# Patient Record
Sex: FEMALE | Race: WHITE | Employment: FULL TIME | URBAN - METROPOLITAN AREA
[De-identification: names, ages, dates, MRNs, and addresses within clinical notes are randomized per-mention and may not be internally consistent; named-entity substitution may affect disease eponyms.]

---

## 2017-01-12 RX ORDER — ALBUTEROL SULFATE 90 UG/1
1 AEROSOL, METERED RESPIRATORY (INHALATION) EVERY 6 HOURS PRN
Qty: 1 INHALER | Refills: 1 | Status: SHIPPED | OUTPATIENT
Start: 2017-01-12 | End: 2017-06-12

## 2017-02-21 ENCOUNTER — OFFICE VISIT (OUTPATIENT)
Dept: FAMILY MEDICINE CLINIC | Facility: CLINIC | Age: 47
End: 2017-02-21

## 2017-02-21 VITALS
HEART RATE: 64 BPM | RESPIRATION RATE: 16 BRPM | HEIGHT: 65 IN | BODY MASS INDEX: 36.99 KG/M2 | TEMPERATURE: 98 F | SYSTOLIC BLOOD PRESSURE: 126 MMHG | WEIGHT: 222 LBS | DIASTOLIC BLOOD PRESSURE: 78 MMHG

## 2017-02-21 DIAGNOSIS — R73.01 IMPAIRED FASTING GLUCOSE: ICD-10-CM

## 2017-02-21 DIAGNOSIS — G24.5 EYE TWITCH: Primary | ICD-10-CM

## 2017-02-21 DIAGNOSIS — Z00.00 LABORATORY EXAMINATION ORDERED AS PART OF A ROUTINE GENERAL MEDICAL EXAMINATION: ICD-10-CM

## 2017-02-21 PROCEDURE — 99213 OFFICE O/P EST LOW 20 MIN: CPT | Performed by: PHYSICIAN ASSISTANT

## 2017-02-22 NOTE — PROGRESS NOTES
[facility] Internal Medicine Progress Note    CC:  Patient presents with:  Eye Twitching: wants referral to neuro      HPI:   HPI  Eye twitching  R eye twitching off and on for the last 7 years  She states you can't see it twitch  She went and sa for nausea and vomiting. Neurological: Negative for dizziness, light-headedness and headaches. /78 mmHg  Pulse 64  Temp(Src) 97.9 °F (36.6 °C) (Oral)  Resp 16  Ht 65\"  Wt 222 lb  BMI 36.94 kg/m2 Body mass index is 36.94 kg/(m^2).   Physical understanding.     Problem List:  Patient Active Problem List:     Iron deficiency anemia     Sacral back pain     Elevated blood pressure     Reactive airway disease     Depression     Rheumatoid arthritis involving multiple sites with positive rheumatoid

## 2017-02-22 NOTE — PATIENT INSTRUCTIONS
Thank you for choosing Bobby James PA-C at [place] [address]  To Do: Sandeep Frost  1. Pt to get lab work done  2. Referral for neuro  3.  RTC in 3 months, sooner if problems    • Please signup for MY CHART, which is electronic access to you improve your quality of life.     Referrals, and Radiology Information:    If your insurance requires a referral to a specialist, please allow 5 business days to process your referral request.    If Kita Muñiz PA-C orders a CT or MRI, it may take up

## 2017-03-01 ENCOUNTER — HOSPITAL ENCOUNTER (OUTPATIENT)
Dept: MRI IMAGING | Age: 47
Discharge: HOME | End: 2017-03-01
Attending: OTOLARYNGOLOGY
Payer: COMMERCIAL

## 2017-03-01 DIAGNOSIS — R42 DIZZINESS: ICD-10-CM

## 2017-03-01 PROCEDURE — 70543 MRI ORBT/FAC/NCK W/O &W/DYE: CPT

## 2017-03-01 PROCEDURE — A9575 INJ GADOTERATE MEGLUMI 0.1ML: HCPCS | Performed by: OTOLARYNGOLOGY

## 2017-03-06 ENCOUNTER — LAB ENCOUNTER (OUTPATIENT)
Dept: LAB | Age: 47
End: 2017-03-06
Attending: Other
Payer: COMMERCIAL

## 2017-03-06 ENCOUNTER — OFFICE VISIT (OUTPATIENT)
Dept: NEUROLOGY | Facility: CLINIC | Age: 47
End: 2017-03-06

## 2017-03-06 VITALS
RESPIRATION RATE: 16 BRPM | HEART RATE: 72 BPM | WEIGHT: 222 LBS | SYSTOLIC BLOOD PRESSURE: 130 MMHG | BODY MASS INDEX: 35.68 KG/M2 | DIASTOLIC BLOOD PRESSURE: 79 MMHG | HEIGHT: 66 IN

## 2017-03-06 DIAGNOSIS — G24.5 EYE TWITCH: ICD-10-CM

## 2017-03-06 DIAGNOSIS — R29.2 HYPER REFLEXIA: ICD-10-CM

## 2017-03-06 DIAGNOSIS — R39.15 URINARY URGENCY: ICD-10-CM

## 2017-03-06 DIAGNOSIS — Z00.00 LABORATORY EXAMINATION ORDERED AS PART OF A ROUTINE GENERAL MEDICAL EXAMINATION: ICD-10-CM

## 2017-03-06 DIAGNOSIS — H53.8 BLURRING, RIGHT EYE: Primary | ICD-10-CM

## 2017-03-06 DIAGNOSIS — R51.9 CHRONIC INTRACTABLE HEADACHE, UNSPECIFIED HEADACHE TYPE: ICD-10-CM

## 2017-03-06 DIAGNOSIS — G89.29 CHRONIC INTRACTABLE HEADACHE, UNSPECIFIED HEADACHE TYPE: ICD-10-CM

## 2017-03-06 DIAGNOSIS — H53.8 BLURRING, RIGHT EYE: ICD-10-CM

## 2017-03-06 DIAGNOSIS — R73.01 IMPAIRED FASTING GLUCOSE: ICD-10-CM

## 2017-03-06 LAB
BASOPHILS # BLD AUTO: 0.13 X10(3) UL (ref 0–0.1)
BASOPHILS NFR BLD AUTO: 1.4 %
C-REACTIVE PROTEIN: 0.99 MG/DL (ref ?–1)
CK: 36 IU/L (ref 26–192)
EOSINOPHIL # BLD AUTO: 0.35 X10(3) UL (ref 0–0.3)
EOSINOPHIL NFR BLD AUTO: 3.9 %
ERYTHROCYTE [DISTWIDTH] IN BLOOD BY AUTOMATED COUNT: 15.3 % (ref 11.5–16)
EST. AVERAGE GLUCOSE BLD GHB EST-MCNC: 111 MG/DL (ref 68–126)
FREE T4: 0.9 NG/DL (ref 0.9–1.8)
HBA1C MFR BLD HPLC: 5.5 % (ref ?–5.7)
HCT VFR BLD AUTO: 40.5 % (ref 34–50)
HGB BLD-MCNC: 12.9 G/DL (ref 12–16)
IMMATURE GRANULOCYTE COUNT: 0.06 X10(3) UL (ref 0–1)
IMMATURE GRANULOCYTE RATIO %: 0.7 %
LYMPHOCYTES # BLD AUTO: 2.89 X10(3) UL (ref 0.9–4)
LYMPHOCYTES NFR BLD AUTO: 31.9 %
MCH RBC QN AUTO: 26.1 PG (ref 27–33.2)
MCHC RBC AUTO-ENTMCNC: 31.9 G/DL (ref 31–37)
MCV RBC AUTO: 81.8 FL (ref 81–100)
MONOCYTES # BLD AUTO: 0.53 X10(3) UL (ref 0.1–0.6)
MONOCYTES NFR BLD AUTO: 5.9 %
NEUTROPHIL ABS PRELIM: 5.09 X10 (3) UL (ref 1.3–6.7)
NEUTROPHILS # BLD AUTO: 5.09 X10(3) UL (ref 1.3–6.7)
NEUTROPHILS NFR BLD AUTO: 56.2 %
PLATELET # BLD AUTO: 228 10(3)UL (ref 150–450)
RBC # BLD AUTO: 4.95 X10(6)UL (ref 3.8–5.1)
RED CELL DISTRIBUTION WIDTH-SD: 45.3 FL (ref 35.1–46.3)
RHEUMATOID FACT SERPL-ACNC: <10 IU/ML (ref ?–15)
SED RATE-ML: 10 MM/HR (ref 0–25)
TSI SER-ACNC: 1.19 MIU/ML (ref 0.35–5.5)
WBC # BLD AUTO: 9.1 X10(3) UL (ref 4–13)

## 2017-03-06 PROCEDURE — 99244 OFF/OP CNSLTJ NEW/EST MOD 40: CPT | Performed by: OTHER

## 2017-03-06 RX ORDER — GABAPENTIN 100 MG/1
CAPSULE ORAL
Qty: 90 CAPSULE | Refills: 2 | Status: SHIPPED | OUTPATIENT
Start: 2017-03-06 | End: 2017-04-06

## 2017-03-06 NOTE — H&P
[facility] Patient / Consult Visit    Lilia Lakhani is a 48year old female.                          Referring MD: Karine Stephens    Patient presents with:  Eye Twitching: Right eye      HPI:    Lilia Lakhani is a chest pain, palpitations, shortness of breath, rashes, joint pains, bowel / bladder incontinence or mood issues.      Past Medical History   Diagnosis Date   • Extrinsic asthma, unspecified    • Depression          Past Surgical History          TO the HPI. PHYSICAL EXAM:   /79 mmHg  Pulse 72  Resp 16  Ht 66\"  Wt 222 lb  BMI 35.85 kg/m2  Estimated body mass index is 35.85 kg/(m^2) as calculated from the following:    Height as of this encounter: 66\".     Weight as of this encounter: 222 l Gait:  Normal casual, heel, toe and tandem gait    TEST RESULTS/DATA REVIEWED:     MRI brain / orbits (3/1/17):     FINDINGS:  Motion degraded examination.      The ventricles and sulci are within normal limits.  There is no midline shift or mass effect.  age.  Adrianna Current  =====  CONCLUSION:     1. Unremarkable MR appearance of the orbits and internal auditory canals. 2. No acute intracranial abnormality.   3. Trace patchy T2 hyperintense signal within the periventricular white matter is nonspecific and could r symptoms. In addition, will evaluate for underlying myelopathy or demyelinating disease with MRI cervical thoracic spine, and send off labs for autoimmune/inflammatory workup (KINJAL, ANCA, NMO Ab, ESR, CRP, RF), as well as myasthenia Ab.    For treatment, (CPT=72156/93447), KINJAL,         DIRECT, REFLEX TO 9 ENAS, ANCA PANEL VASCULITIS        W/REFLEX, AQUAPORIN-4 RECEPTOR ANTIBODY,         RHEUMATOID ARTHRITIS FACTOR, SED RATE,         WESTERGREN (AUTOMATED), C-REACTIVE PROTEIN,         ANCA PANEL VASCULITIS

## 2017-03-06 NOTE — PATIENT INSTRUCTIONS
Have labs and imaging done   Start gabapentin at 100 mg nightly x1 week, increase to 200 mg x1 week, then increase to 300 mg nightly  Follow up in ~ 1 month after testing done     Refill policies:    • Allow 2 business days for refills; controlled substanc determine coverage. If test is done without insurance authorization, patient may be responsible for the entire amount billed.       Precertification and Prior Authorizations  If your physician has recommended that you have a procedure or additional testing

## 2017-03-08 LAB — ANA SCREEN: NEGATIVE

## 2017-03-09 ENCOUNTER — TELEPHONE (OUTPATIENT)
Dept: NEUROLOGY | Facility: CLINIC | Age: 47
End: 2017-03-09

## 2017-03-09 LAB
ACETYLCHOLINE BINDING AB: 0 NMOL/L
ACETYLCHOLINE BLOCKING AB: 0 %
AQUAPORIN-4 RECEPTOR ANTIBODY: 0 U/ML
MYELOPEROX ANTIBODIES, IGG: 6 AU/ML
SERINE PROTEASE3, IGG: 0 AU/ML
TITIN ANTIBODY: 0.19 IV

## 2017-03-09 NOTE — TELEPHONE ENCOUNTER
MRI Cervical / Thoracic (cpt code 62502 / 95890), CTA Brain / CTA Carotids (cpt code 75899 / 93161)  Received fax PA approved  Auth# [id number]  Valid from 03/07/17 to 06/05/17    Pt is not scheduled at this time for test. Contacted pt and advised of respon

## 2017-03-15 ENCOUNTER — TELEPHONE (OUTPATIENT)
Dept: NEUROLOGY | Facility: CLINIC | Age: 47
End: 2017-03-15

## 2017-03-15 NOTE — TELEPHONE ENCOUNTER
----- Message from Brittany Osorio MD sent at 3/7/2017  9:11 AM CST -----  Results noted, labs normal thus far - awaiting additional labs    All labs results available in Epic. Spoke with Dr. Shital Cook, all have come back normal. To still complete MRI.  Deonna

## 2017-03-17 ENCOUNTER — HOSPITAL ENCOUNTER (OUTPATIENT)
Dept: MRI IMAGING | Age: 47
Discharge: HOME | End: 2017-03-17
Attending: Other
Payer: COMMERCIAL

## 2017-03-17 ENCOUNTER — HOSPITAL ENCOUNTER (OUTPATIENT)
Dept: CT IMAGING | Age: 47
Discharge: HOME | End: 2017-03-17
Attending: Other
Payer: COMMERCIAL

## 2017-03-17 DIAGNOSIS — G89.29 CHRONIC INTRACTABLE HEADACHE, UNSPECIFIED HEADACHE TYPE: ICD-10-CM

## 2017-03-17 DIAGNOSIS — R39.15 URINARY URGENCY: ICD-10-CM

## 2017-03-17 DIAGNOSIS — R29.2 HYPER REFLEXIA: ICD-10-CM

## 2017-03-17 DIAGNOSIS — R51.9 CHRONIC INTRACTABLE HEADACHE, UNSPECIFIED HEADACHE TYPE: ICD-10-CM

## 2017-03-17 DIAGNOSIS — H53.8 BLURRING, RIGHT EYE: ICD-10-CM

## 2017-03-17 DIAGNOSIS — G24.5 EYE TWITCH: ICD-10-CM

## 2017-03-17 PROCEDURE — 72157 MRI CHEST SPINE W/O & W/DYE: CPT

## 2017-03-17 PROCEDURE — A9575 INJ GADOTERATE MEGLUMI 0.1ML: HCPCS | Performed by: OTHER

## 2017-03-17 PROCEDURE — 70496 CT ANGIOGRAPHY HEAD: CPT

## 2017-03-17 PROCEDURE — 72156 MRI NECK SPINE W/O & W/DYE: CPT

## 2017-03-17 PROCEDURE — 70498 CT ANGIOGRAPHY NECK: CPT

## 2017-04-06 ENCOUNTER — OFFICE VISIT (OUTPATIENT)
Dept: NEUROLOGY | Facility: CLINIC | Age: 47
End: 2017-04-06

## 2017-04-06 VITALS
HEART RATE: 80 BPM | DIASTOLIC BLOOD PRESSURE: 80 MMHG | SYSTOLIC BLOOD PRESSURE: 138 MMHG | WEIGHT: 221 LBS | BODY MASS INDEX: 36 KG/M2 | RESPIRATION RATE: 18 BRPM

## 2017-04-06 DIAGNOSIS — G24.5 EYE TWITCH: Primary | ICD-10-CM

## 2017-04-06 DIAGNOSIS — H53.8 BLURRING, RIGHT EYE: ICD-10-CM

## 2017-04-06 DIAGNOSIS — R29.2 HYPER REFLEXIA: ICD-10-CM

## 2017-04-06 PROCEDURE — 99214 OFFICE O/P EST MOD 30 MIN: CPT | Performed by: OTHER

## 2017-04-06 RX ORDER — GABAPENTIN 300 MG/1
300 CAPSULE ORAL 3 TIMES DAILY
Qty: 90 CAPSULE | Refills: 2 | Status: SHIPPED | OUTPATIENT
Start: 2017-04-06 | End: 2017-07-10

## 2017-04-06 NOTE — PATIENT INSTRUCTIONS
Increase gabapentin to 300 mg three times daily  Schedule EMG  Follow up after testing done; see PCP as well. Refill policies:    • Allow 2 business days for refills; controlled substances may take longer.   • Contact your pharmacy at least 5 days prior t have a procedure or additional testing performed. [facility]) will contact your insurance carrier to obtain pre-certification or prior authorization.     Unfortunately, [facility] has seen an increase in denial of payment even though the p

## 2017-04-06 NOTE — PROGRESS NOTES
[facility] Progress Note    HPI  Patient presents with:  Neurologic Problem      As per my initial H&P from 3/6/17:  Emmanuel Ringjose Cortez is a 48year old, who presents for evaluation of right eye twitching.  She states she feels like incontinence or mood issues. \"                 Since last visit, she has been started on gabapentin - notes twitching is less than prior visits at times when she is watching television. She denies any worsening of her prior symptoms.   She continues to [employer] gabapentin 100 MG Oral Cap, Start at 100 mg nightly x1 week, increase to 200 mg x1 week, then increase to 300 mg nightly (Patient taking differently: Take 300 mg by mouth nightly.  ), Disp: 90 capsule, Rfl: 2  •  Albuterol Sulfate  (90 BASE) MCG/ACT 10(3)uL 228.0   MCV      81.0-100.0 fL 81.8   MCH      27.0-33.2 pg 26.1 (L)   MCHC      31.0-37.0 g/dL 31.9   RDW      11.5-16.0 % 15.3   RDW-SD      35.1-46.3 fL 45.3   Prelim Neutrophil Abs      1.30-6.70 x10 (3) uL 5.09   Neutrophils Absolute      1.30 CISTERNS:  Normal.  No subarachnoid hemorrhage or effacement.     SKULL:  Negative.         LEFT  INTERNAL CAROTID:  No hemodynamically significant stenosis or dissection.   EXTERNAL CAROTID:  No hemodynamically significant stenosis or dissection  COMMON CA with no visible mass.     PATTIE STRUCTURES:  Hemangioma within C4.  No fracture, pars defect, or osseous lesion.     CORD:  Normal caliber, contour and signal intensity.         THORACIC DISC LEVELS:  CORD:  Normal caliber, contour, and signal.  The conus t reported thus far.      (G24.5) Eye twitch  (primary encounter diagnosis)  Plan: gabapentin 300 MG Oral Cap, EMG ([address] [place] [address])        As noted above     (H53.8) Blurring, right eye  Plan: as noted above     (R29.2) Hyper r

## 2017-05-15 ENCOUNTER — OFFICE VISIT (OUTPATIENT)
Dept: NEUROLOGY | Facility: CLINIC | Age: 47
End: 2017-05-15

## 2017-05-15 DIAGNOSIS — R29.2 HYPER REFLEXIA: ICD-10-CM

## 2017-05-15 DIAGNOSIS — G24.5 EYE TWITCH: Primary | ICD-10-CM

## 2017-05-15 PROCEDURE — 95886 MUSC TEST DONE W/N TEST COMP: CPT | Performed by: OTHER

## 2017-05-15 PROCEDURE — 95910 NRV CNDJ TEST 7-8 STUDIES: CPT | Performed by: OTHER

## 2017-05-15 NOTE — PROCEDURES
[facility]  [address] [place], [address] [facility]  Ph: [phone]  FAX: [phone]        Full Name: Linnea Riley Gender: Female  Patient ID: [id number] YOB: 1970      Visit Date: 5/15/2017 1 Ankle AH 4.11 13.2 100 6.25 39.4 Ankle - AH 8        Pop fossa AH 11.77 10.9 82.3 6.98 40.3 Pop fossa - Ankle 36 7.66 47       EMG         EMG Summary Table     Spontaneous MUAP Recruitment   Muscle Nerve Roots IA Fib PSW Fasc H.F. Amp Dur.  PPP Pattern

## 2017-06-12 ENCOUNTER — OFFICE VISIT (OUTPATIENT)
Dept: FAMILY MEDICINE CLINIC | Facility: CLINIC | Age: 47
End: 2017-06-12

## 2017-06-12 VITALS
HEIGHT: 66 IN | HEART RATE: 82 BPM | SYSTOLIC BLOOD PRESSURE: 130 MMHG | TEMPERATURE: 98 F | RESPIRATION RATE: 16 BRPM | BODY MASS INDEX: 37.45 KG/M2 | DIASTOLIC BLOOD PRESSURE: 86 MMHG | WEIGHT: 233 LBS

## 2017-06-12 DIAGNOSIS — L30.9 DERMATITIS: ICD-10-CM

## 2017-06-12 DIAGNOSIS — F32.A DEPRESSION, UNSPECIFIED DEPRESSION TYPE: ICD-10-CM

## 2017-06-12 DIAGNOSIS — J45.20 REACTIVE AIRWAY DISEASE, MILD INTERMITTENT, UNCOMPLICATED: ICD-10-CM

## 2017-06-12 DIAGNOSIS — N28.1 RENAL CYST: Primary | ICD-10-CM

## 2017-06-12 DIAGNOSIS — M54.50 BILATERAL LOW BACK PAIN WITHOUT SCIATICA, UNSPECIFIED CHRONICITY: ICD-10-CM

## 2017-06-12 PROCEDURE — 99214 OFFICE O/P EST MOD 30 MIN: CPT | Performed by: PHYSICIAN ASSISTANT

## 2017-06-12 RX ORDER — ESCITALOPRAM OXALATE 20 MG/1
20 TABLET ORAL DAILY
Qty: 90 TABLET | Refills: 1 | Status: SHIPPED | OUTPATIENT
Start: 2017-06-12 | End: 2017-07-05

## 2017-06-12 RX ORDER — METHYLPREDNISOLONE 4 MG/1
TABLET ORAL
Qty: 1 KIT | Refills: 0 | Status: SHIPPED | OUTPATIENT
Start: 2017-06-12 | End: 2017-06-24

## 2017-06-12 RX ORDER — EPINEPHRINE 0.3 MG/.3ML
0.3 INJECTION SUBCUTANEOUS ONCE
Qty: 1 EACH | Refills: 0 | Status: SHIPPED | OUTPATIENT
Start: 2017-06-12 | End: 2017-06-12

## 2017-06-12 RX ORDER — ALBUTEROL SULFATE 90 UG/1
1 AEROSOL, METERED RESPIRATORY (INHALATION) EVERY 6 HOURS PRN
Qty: 1 INHALER | Refills: 3 | Status: SHIPPED | OUTPATIENT
Start: 2017-06-12 | End: 2019-11-19

## 2017-06-12 RX ORDER — METHOCARBAMOL 750 MG/1
750 TABLET, FILM COATED ORAL 3 TIMES DAILY
Qty: 30 TABLET | Refills: 0 | Status: SHIPPED | OUTPATIENT
Start: 2017-06-12 | End: 2017-06-24

## 2017-06-12 NOTE — PROGRESS NOTES
[facility] Internal Medicine Progress Note    CC:  Patient presents with:  Test Results  Derm Problem  Medication Request  Low Back Pain: x months - wants PT      HPI:   HPI  Renal cyst  Incidental found on MRI of cervical/thoracic spine  MRI albrecht Prescriptions on File Prior to Visit:  gabapentin 300 MG Oral Cap Take 1 capsule (300 mg total) by mouth 3 (three) times daily.  Disp: 90 capsule Rfl: 2   Fluticasone Propionate (FLONASE) 50 MCG/ACT Nasal Suspension 2 sprays in each nostril daily as needed tenderness, no bony tenderness, no swelling, no edema, no deformity, no laceration, no spasm and normal pulse. Strength, sensation, DTRs, pulse intact to bilateral LEs   Lymphadenopathy:     She has no cervical adenopathy.    Neurological: She is alert an Patient/Caregiver Education: There are no barriers to learning. Medical education done. Outcome: Patient verbalizes understanding.     Problem List:  Patient Active Problem List:     Iron deficiency anemia     Sacral back pain     Elevated blood pressure

## 2017-06-12 NOTE — PATIENT INSTRUCTIONS
Thank you for choosing Obed Peña PA-C at [place] [address]  To Do: Kinga Crandall  1. Pt to begin medications as prescribed  2. Get imaging done  3. Start physical therapy  4. Follow-up with Beth  5.  Return to clinic in 1 month, sooner if benefits outweigh those potential risks and we strive to make you healthier and to improve your quality of life.     Referrals, and Radiology Information:    If your insurance requires a referral to a specialist, please allow 5 business days to process your

## 2017-06-13 ENCOUNTER — HOSPITAL ENCOUNTER (OUTPATIENT)
Dept: ULTRASOUND IMAGING | Age: 47
Discharge: HOME | End: 2017-06-13
Attending: PHYSICIAN ASSISTANT
Payer: COMMERCIAL

## 2017-06-13 DIAGNOSIS — N28.1 RENAL CYST: ICD-10-CM

## 2017-06-13 PROCEDURE — 76770 US EXAM ABDO BACK WALL COMP: CPT | Performed by: PHYSICIAN ASSISTANT

## 2017-06-22 ENCOUNTER — PATIENT MESSAGE (OUTPATIENT)
Dept: FAMILY MEDICINE CLINIC | Facility: CLINIC | Age: 47
End: 2017-06-22

## 2017-06-22 NOTE — TELEPHONE ENCOUNTER
Future Appointments  Date Time Provider Lang Nohemi   6/28/2017 4:45 PM Afshin Cortez PT [facility] [place]   7/3/2017 6:30 PM ISHA Velasquez PT [place]   7/5/2017 6:30 PM Priyanka Hardy PT [facility] [place]   7/10/2017 6:15 PM Heidi Sterling

## 2017-06-22 NOTE — TELEPHONE ENCOUNTER
From: Meghan Rebollar  To: Myles Muniz  Sent: 6/22/2017 8:46 AM CDT  Subject: Non-Urgent Medical Question    Lennox Andrews finished the round of steroids for the rash, but I still have the rash on my arm and it's still itching and has

## 2017-06-24 ENCOUNTER — OFFICE VISIT (OUTPATIENT)
Dept: FAMILY MEDICINE CLINIC | Facility: CLINIC | Age: 47
End: 2017-06-24

## 2017-06-24 ENCOUNTER — HOSPITAL ENCOUNTER (OUTPATIENT)
Dept: GENERAL RADIOLOGY | Age: 47
Discharge: HOME | End: 2017-06-24
Attending: PHYSICIAN ASSISTANT
Payer: COMMERCIAL

## 2017-06-24 VITALS
DIASTOLIC BLOOD PRESSURE: 88 MMHG | HEIGHT: 66 IN | WEIGHT: 231.63 LBS | HEART RATE: 72 BPM | BODY MASS INDEX: 37.23 KG/M2 | SYSTOLIC BLOOD PRESSURE: 114 MMHG | TEMPERATURE: 98 F

## 2017-06-24 DIAGNOSIS — M79.674 TOE PAIN, RIGHT: Primary | ICD-10-CM

## 2017-06-24 DIAGNOSIS — J45.30 MILD PERSISTENT ASTHMA WITHOUT COMPLICATION: ICD-10-CM

## 2017-06-24 DIAGNOSIS — M79.674 TOE PAIN, RIGHT: ICD-10-CM

## 2017-06-24 DIAGNOSIS — L30.9 DERMATITIS: ICD-10-CM

## 2017-06-24 PROCEDURE — 73660 X-RAY EXAM OF TOE(S): CPT | Performed by: PHYSICIAN ASSISTANT

## 2017-06-24 PROCEDURE — 99214 OFFICE O/P EST MOD 30 MIN: CPT | Performed by: PHYSICIAN ASSISTANT

## 2017-06-24 RX ORDER — NYSTATIN AND TRIAMCINOLONE ACETONIDE 100000; 1 [USP'U]/G; MG/G
1 OINTMENT TOPICAL 2 TIMES DAILY
Qty: 60 G | Refills: 0 | Status: SHIPPED | OUTPATIENT
Start: 2017-06-24 | End: 2018-06-14

## 2017-06-24 RX ORDER — METHOCARBAMOL 750 MG/1
750 TABLET, FILM COATED ORAL EVERY 8 HOURS PRN
Qty: 60 TABLET | Refills: 0 | Status: SHIPPED | OUTPATIENT
Start: 2017-06-24 | End: 2018-06-14

## 2017-06-24 RX ORDER — MONTELUKAST SODIUM 10 MG/1
10 TABLET ORAL NIGHTLY
Qty: 90 TABLET | Refills: 1 | Status: SHIPPED | OUTPATIENT
Start: 2017-06-24 | End: 2017-07-31

## 2017-06-24 NOTE — PATIENT INSTRUCTIONS
Thank you for choosing Obed Peña PA-C at [place] [address]  To Do: Kinga Crandall  1. Pt to get xrays  2. Start medications as prescribed  3. Follow-up with dermatologist if symptoms are not improving  4.  Follow-up in 1 month  Effective 6/ beyond those discussed today.  All therapies have potential risk of harm or side effects or medication interactions.  It is your duty and for your safety to discuss with the pharmacist and our office with questions, and to notify us and stop treatment if p

## 2017-06-24 NOTE — PROGRESS NOTES
[facility] Internal Medicine Progress Note    CC:  Patient presents with:  Rash: not better after 1 week of steroids, spreading all over her body.       HPI:   HPI  Rash  Pt states her rash is not better and is spreading all over her body  She sta postnasal drip, rhinorrhea, sinus pressure and sore throat. Respiratory: Negative for cough and shortness of breath. Cardiovascular: Negative for chest pain. Gastrointestinal: Negative for nausea and vomiting. Skin: Positive for rash.    Neurologi start singulair, discussed side effects and adverse effects of medication    RTC in 1 month    No orders of the defined types were placed in this encounter.       Meds & Refills for this Visit:  Signed Prescriptions Disp Refills    methocarbamol 750 MG Oral

## 2017-06-28 ENCOUNTER — APPOINTMENT (OUTPATIENT)
Dept: PHYSICAL THERAPY | Age: 47
End: 2017-06-28
Payer: COMMERCIAL

## 2017-06-29 DIAGNOSIS — M79.674 TOE PAIN, RIGHT: Primary | ICD-10-CM

## 2017-06-30 ENCOUNTER — TELEPHONE (OUTPATIENT)
Dept: FAMILY MEDICINE CLINIC | Facility: CLINIC | Age: 47
End: 2017-06-30

## 2017-06-30 NOTE — TELEPHONE ENCOUNTER
vd call from Aileen Wright asking for verbal OK to fill escitalopram for #90 with 1 refill. This med was just filled for patient on [facility]12/[facility] but was not covered by insurance because was not a 30 day supply.   90 day supply will be cover

## 2017-07-03 ENCOUNTER — OFFICE VISIT (OUTPATIENT)
Dept: PHYSICAL THERAPY | Age: 47
End: 2017-07-03
Attending: PHYSICIAN ASSISTANT
Payer: COMMERCIAL

## 2017-07-03 PROCEDURE — 97162 PT EVAL MOD COMPLEX 30 MIN: CPT

## 2017-07-03 PROCEDURE — 97110 THERAPEUTIC EXERCISES: CPT

## 2017-07-03 NOTE — PROGRESS NOTES
SPINE EVALUATION:   Referring Physician: Dr. Kori Valenzuela  Diagnosis: Bilateral low back pain without sciatica, unspecified chronicity (M54.5)  Date of Service: 7/3/2017     PATIENT SUMMARY   Keyur Whitman is a 50year old y/o female who presents to muscle tone on R lumbar paraspinals, moderate tenderness on R piriformis, weakness in core musculature, flexibility restrictions in B hip flexors, B ITB and B piriformis.   Cedric Ramirez would benefit from skilled Physical Therapy to address the above impairments a Moderate Complexity decision making due to 3+ personal factors/comorbidities, 3 body structures involved/activity limitations, and evolving symptoms including changing pain levels.   PLAN OF CARE:    Goals:  (to be met in 8 visits)  · Pt will improve lower certify the need for these services furnished under this plan of treatment and while under my care.     X___________________________________________________ Date____________________    Certification From: 5/3/6227  To:10/1/2017

## 2017-07-05 ENCOUNTER — OFFICE VISIT (OUTPATIENT)
Dept: PHYSICAL THERAPY | Age: 47
End: 2017-07-05
Attending: PHYSICIAN ASSISTANT
Payer: COMMERCIAL

## 2017-07-05 PROCEDURE — 97140 MANUAL THERAPY 1/> REGIONS: CPT

## 2017-07-05 PROCEDURE — 97110 THERAPEUTIC EXERCISES: CPT

## 2017-07-05 RX ORDER — ESCITALOPRAM OXALATE 20 MG/1
20 TABLET ORAL DAILY
Qty: 90 TABLET | Refills: 1 | Status: SHIPPED | OUTPATIENT
Start: 2017-07-05 | End: 2017-07-17

## 2017-07-05 NOTE — PROGRESS NOTES
Dx:  Bilateral low back pain without sciatica, unspecified chronicity (M54.5)       Authorized # of Visits:  8        Next MD visit: none scheduled  Fall Risk: standard         Precautions: n/a             Subjective: Patient states that there is no pain i board AP with abdominal engagement x 20         instruction on finding neutral spine position in standing and while sitting on swiss ball         Standing B hip abd, flex x 10 each         Alternate leg lifts with abdominal bracing 5 sec x 10         Bent

## 2017-07-05 NOTE — TELEPHONE ENCOUNTER
Time started: 11:14am    Time ended: 11:20am    Total time spent on chart: 6 min    Spoke with patient and confirmed with her that she does want her escitalopram sent to [facility].  Her insurance requires her to have maintenance meds filled through m

## 2017-07-10 ENCOUNTER — OFFICE VISIT (OUTPATIENT)
Dept: PHYSICAL THERAPY | Age: 47
End: 2017-07-10
Attending: INTERNAL MEDICINE
Payer: COMMERCIAL

## 2017-07-10 DIAGNOSIS — G24.5 EYE TWITCH: ICD-10-CM

## 2017-07-10 PROCEDURE — 97110 THERAPEUTIC EXERCISES: CPT

## 2017-07-10 PROCEDURE — 97140 MANUAL THERAPY 1/> REGIONS: CPT

## 2017-07-10 PROCEDURE — 97112 NEUROMUSCULAR REEDUCATION: CPT

## 2017-07-10 NOTE — PROGRESS NOTES
Dx:  Bilateral low back pain without sciatica, unspecified chronicity (M54.5)       Authorized # of Visits:  8        Next MD visit: none scheduled  Fall Risk: standard         Precautions: n/a             Subjective: Patient states she walked a lot over t 7/ Date:    Tx#: 8/   Treadmill x 5 min 2.4 mph x5'        Balance board AP with abdominal engagement x 20 Balance board AP with abdominal engagement x 20        instruction on finding neutral spine position in standing and while sitting on swiss ball [place]

## 2017-07-10 NOTE — TELEPHONE ENCOUNTER
Medication: gabapentin    Date of last refill: 6/10/17  [place]    Last office visit: 4/6/17  Due back to clinic per last office note:  prn  Date next office visit scheduled:  Future Appointments  Date Time Provider D

## 2017-07-11 RX ORDER — GABAPENTIN 300 MG/1
300 CAPSULE ORAL 3 TIMES DAILY
Qty: 90 CAPSULE | Refills: 2 | Status: SHIPPED | OUTPATIENT
Start: 2017-07-11 | End: 2017-09-15

## 2017-07-12 ENCOUNTER — APPOINTMENT (OUTPATIENT)
Dept: PHYSICAL THERAPY | Age: 47
End: 2017-07-12
Payer: COMMERCIAL

## 2017-07-13 ENCOUNTER — OFFICE VISIT (OUTPATIENT)
Dept: PHYSICAL THERAPY | Age: 47
End: 2017-07-13
Attending: PHYSICIAN ASSISTANT
Payer: COMMERCIAL

## 2017-07-13 PROCEDURE — 97112 NEUROMUSCULAR REEDUCATION: CPT

## 2017-07-13 PROCEDURE — 97140 MANUAL THERAPY 1/> REGIONS: CPT

## 2017-07-13 PROCEDURE — 97110 THERAPEUTIC EXERCISES: CPT

## 2017-07-13 NOTE — PROGRESS NOTES
Dx:  Bilateral low back pain without sciatica, unspecified chronicity (M54.5)       Authorized # of Visits:  8        Next MD visit: as needed.    Fall Risk: standard         Precautions: n/a             Subjective: Pt states she is very busy at work, [employer] progress achieved in PT.- In progress    Plan: Continue physical therapy. Date: 7/5/2017  Tx#: 2/8 Date: 7/10/17  Tx#: 3/ Date: 7/13/17  Tx#: 4/ Date: Tx#: 5/ Date: Tx#: 6/ Date: Tx#: 7/ Date:    Tx#: 8/   Treadmill x 5 min 2.4 mph x5' x5       Ba

## 2017-07-17 ENCOUNTER — OFFICE VISIT (OUTPATIENT)
Dept: PHYSICAL THERAPY | Age: 47
End: 2017-07-17
Attending: INTERNAL MEDICINE
Payer: COMMERCIAL

## 2017-07-17 PROCEDURE — 97112 NEUROMUSCULAR REEDUCATION: CPT

## 2017-07-17 PROCEDURE — 97140 MANUAL THERAPY 1/> REGIONS: CPT

## 2017-07-17 PROCEDURE — 97110 THERAPEUTIC EXERCISES: CPT

## 2017-07-17 RX ORDER — ESCITALOPRAM OXALATE 20 MG/1
20 TABLET ORAL DAILY
Qty: 90 TABLET | Refills: 1 | Status: CANCELLED
Start: 2017-07-17

## 2017-07-17 NOTE — PROGRESS NOTES
Dx:  Bilateral low back pain without sciatica, unspecified chronicity (M54.5)       Authorized # of Visits:  8        Next MD visit: as needed.    Fall Risk: standard         Precautions: n/a             Subjective: Pt states she had a very busy weekend and Date:   Tx#: 6/ Date: Tx#: 7/ Date:    Tx#: 8/   Treadmill x 5 min 2.4 mph x5' x5 x6'      Balance board AP with abdominal engagement x 20 Balance board AP with abdominal engagement x 20 Balance board AP x20  ML x 20 with abdominal engagement x 20 Balance

## 2017-07-17 NOTE — TELEPHONE ENCOUNTER
From: Keyur Whitman  Sent: 7/17/2017 7:40 AM CDT  Subject: Medication Renewal Request    Anjelica Vela would like a refill of the following medications:  Montelukast Sodium 10 MG Oral Tab Moon Haider PA-C]    [facility]

## 2017-07-17 NOTE — TELEPHONE ENCOUNTER
From: Nay De Jesus  Sent: 7/17/2017 7:40 AM CDT  Subject: Medication Renewal Request    [facility] CASSIE Aguila would like a refill of the following medications:  escitalopram (LEXAPRO) 20 MG Oral Tab Yfn Hernandez MD]    Preferred pharmacy: [id number]

## 2017-07-18 ENCOUNTER — PATIENT MESSAGE (OUTPATIENT)
Dept: FAMILY MEDICINE CLINIC | Facility: CLINIC | Age: 47
End: 2017-07-18

## 2017-07-18 RX ORDER — ESCITALOPRAM OXALATE 20 MG/1
20 TABLET ORAL DAILY
Qty: 90 TABLET | Refills: 1 | Status: SHIPPED | OUTPATIENT
Start: 2017-07-18 | End: 2018-01-10

## 2017-07-18 RX ORDER — MONTELUKAST SODIUM 10 MG/1
10 TABLET ORAL NIGHTLY
Qty: 90 TABLET | Refills: 1
Start: 2017-07-18

## 2017-07-18 RX ORDER — ESCITALOPRAM OXALATE 20 MG/1
20 TABLET ORAL DAILY
Qty: 30 TABLET | Refills: 0 | Status: SHIPPED
Start: 2017-07-18 | End: 2017-07-18

## 2017-07-18 NOTE — TELEPHONE ENCOUNTER
Time started: 156    Time ended: 200    Total time spent on chart: 4 min    Spoke with pt and she states that she called mail order and they have no record of receiving the script for lexapro.   She will be completely out and is asking if we can send a 30 d

## 2017-07-18 NOTE — TELEPHONE ENCOUNTER
Pt called to check status on rx pt states she is completely out of rx states she had a refill but was told by pharmacy that someone from the office called to cx

## 2017-07-18 NOTE — TELEPHONE ENCOUNTER
From: Victorino Alberto  Sent: 7/17/2017 10:15 PM CDT  Subject: Medication Renewal Request    Pinky Zamora would like a refill of the following medications:  escitalopram (LEXAPRO) 20 MG Oral Tab Harriet Choe MD]    Preferred pharmacy: [facility]

## 2017-07-18 NOTE — TELEPHONE ENCOUNTER
Requesting: Singulair 10mg  LOV: 6/14/17  RTC: 1 month  Last Labs: 3/6/17  Filled: 6/24/17 #90 with 1 refills    Future Appointments  Date Time Provider Lang Song   7/19/2017 6:30 PM ISHA Valentino PT   7/31/2017 6:15 PM Noemi Salvador

## 2017-07-19 ENCOUNTER — OFFICE VISIT (OUTPATIENT)
Dept: PHYSICAL THERAPY | Age: 47
End: 2017-07-19
Attending: PHYSICIAN ASSISTANT
Payer: COMMERCIAL

## 2017-07-19 PROCEDURE — 97140 MANUAL THERAPY 1/> REGIONS: CPT

## 2017-07-19 PROCEDURE — 97110 THERAPEUTIC EXERCISES: CPT

## 2017-07-19 PROCEDURE — 97112 NEUROMUSCULAR REEDUCATION: CPT

## 2017-07-19 NOTE — PROGRESS NOTES
Dx:  Bilateral low back pain without sciatica, unspecified chronicity (M54.5)       Authorized # of Visits:  8        Next MD visit: as needed.    Fall Risk: standard         Precautions: n/a             Subjective: Pt states she has had no more pain down h Date:   Tx#: 7/ Date:    Tx#: 8/   Treadmill x 5 min 2.4 mph x5' x5 x6' X 6 min     Balance board AP with abdominal engagement x 20 Balance board AP with abdominal engagement x 20 Balance board AP x20  ML x 20 with abdominal engagement x 20 Balance board AP

## 2017-07-19 NOTE — TELEPHONE ENCOUNTER
From: Victorino Alberto  To: Nixon Angela  Sent: 7/18/2017 8:06 PM CDT  Subject: Prescription Question    I'm so confused and need help.  [facility] I went to pick it up and they said you canceled the p

## 2017-07-31 ENCOUNTER — OFFICE VISIT (OUTPATIENT)
Dept: PHYSICAL THERAPY | Age: 47
End: 2017-07-31
Attending: INTERNAL MEDICINE
Payer: COMMERCIAL

## 2017-07-31 PROCEDURE — 97112 NEUROMUSCULAR REEDUCATION: CPT

## 2017-07-31 PROCEDURE — 97140 MANUAL THERAPY 1/> REGIONS: CPT

## 2017-07-31 PROCEDURE — 97110 THERAPEUTIC EXERCISES: CPT

## 2017-07-31 NOTE — PROGRESS NOTES
Dx:  Bilateral low back pain without sciatica, unspecified chronicity (M54.5)       Authorized # of Visits:  8        Next MD visit: as needed.    Fall Risk: standard         Precautions: n/a             Subjective: Pt states she felt the soreness R buttock 7/13/17  Tx#: 4/ Date: 7/17/17  Tx#: 5/8 Date: 7/19/2017  Tx#: 6/ Date: 7/31/17  Tx#: 7/ Date:    Tx#: 8/   Treadmill x 5 min 2.4 mph x5' x5 x6' X 6 min x6'    Balance board AP with abdominal engagement x 20 Balance board AP with abdominal engagement x 20 B education on finding neutral spine position, verbal cues for TrA activation, exercise additions.     Charges: Theo 1( 15 min) NR x 1 (20')  MT1 ( 10 min)   Total Timed Treatment: 45 min     Total Treatment Time: 55 min

## 2017-08-01 NOTE — TELEPHONE ENCOUNTER
From: Alisson Zarate  Sent: 7/31/2017 6:09 PM CDT  Subject: Medication Renewal Request    Jaylon Francis would like a refill of the following medications:  Montelukast Sodium 10 MG Oral Tab Jinny Hoover PA-C]    Preferred pharmacy: [facility]

## 2017-08-02 ENCOUNTER — OFFICE VISIT (OUTPATIENT)
Dept: PHYSICAL THERAPY | Age: 47
End: 2017-08-02
Attending: INTERNAL MEDICINE
Payer: COMMERCIAL

## 2017-08-02 PROCEDURE — 97112 NEUROMUSCULAR REEDUCATION: CPT

## 2017-08-02 PROCEDURE — 97110 THERAPEUTIC EXERCISES: CPT

## 2017-08-02 NOTE — PROGRESS NOTES
Dx:  Bilateral low back pain without sciatica, unspecified chronicity (M54.5)       Authorized # of Visits:  8        Next MD visit: as needed.    Fall Risk: standard         Precautions: n/a            Progress /Discharge Summary    Pt has attended 8, canc ADL. - MET  · Pt will have decreased paraspinal mm tension for improved lumbar mobility to tolerate standing > 60 minutes for work and home activities.  - MET  · Pt will demonstrate improved core strength to be able to perform housework, getting up from bed each X 1' each LE's on SB  -DKTC  -bridge x10 modified range Back care review.  ie posture, ergo, walking program, stretch breaks   Bent leg fallout with abdominal bracing 5 sec x 10 SKTC x3 each -pect stretches T and Y  -snow shaye x3 -single leg march 10x

## 2017-08-03 RX ORDER — MONTELUKAST SODIUM 10 MG/1
10 TABLET ORAL NIGHTLY
Qty: 90 TABLET | Refills: 1 | Status: SHIPPED
Start: 2017-08-03 | End: 2018-01-10

## 2017-08-03 NOTE — TELEPHONE ENCOUNTER
Time started: 1001    Time ended: 1002    Total time spent on chart: 2 mins    Requesting montelukast 10mg   LOV: 6/24/17  RTC: 1 month  Last Labs: 3/6/17  Filled: 6/24/17  # 90 with 1 refills    Patient has Reactive Airway disease.   ACT/AAP done 6/24/17

## 2017-08-15 ENCOUNTER — TELEPHONE (OUTPATIENT)
Dept: FAMILY MEDICINE CLINIC | Facility: CLINIC | Age: 47
End: 2017-08-15

## 2017-08-15 DIAGNOSIS — Z12.31 ENCOUNTER FOR SCREENING MAMMOGRAM FOR MALIGNANT NEOPLASM OF BREAST: Primary | ICD-10-CM

## 2017-08-15 NOTE — TELEPHONE ENCOUNTER
Last mammogram is 6/11/16 on chart. I called patient to ask if she will proceed with testing. She will. Order placed.     Time started: 226    Time ended: 228    Total time spent on chart: 2 min

## 2017-09-15 DIAGNOSIS — G24.5 EYE TWITCH: ICD-10-CM

## 2017-09-18 RX ORDER — GABAPENTIN 300 MG/1
300 CAPSULE ORAL 3 TIMES DAILY
Qty: 90 CAPSULE | Refills: 2 | Status: SHIPPED
Start: 2017-09-18 | End: 2018-06-14

## 2017-09-18 NOTE — TELEPHONE ENCOUNTER
From: [employer]  Sent: 9/15/2017 10:17 AM CDT  Subject: Medication Renewal Request    Jarek Burgess would like a refill of the following medications:     gabapentin 300 MG Oral Cap Nakul Hernandez MD]    Preferred pharmacy: [facility]

## 2017-09-18 NOTE — TELEPHONE ENCOUNTER
Medication: Gabapentin 300mg     Date of last refill: 7/11/17  [place]     Last office visit: 4-6-17  Due back to clinic per last office note:  Prn- after EMG ( 5/15/17  Date next office visit scheduled:  No appointment

## 2017-09-21 ENCOUNTER — PATIENT MESSAGE (OUTPATIENT)
Dept: FAMILY MEDICINE CLINIC | Facility: CLINIC | Age: 47
End: 2017-09-21

## 2017-09-21 NOTE — TELEPHONE ENCOUNTER
From: Russell Camargo  To: Lyndsay Max  Sent: 9/21/2017 10:15 AM CDT  Subject: Referral Request    Elan lazaro, my  is having some issues and would like to go see a urologist. Is there one that you would recommend?  Male preferred

## 2018-01-10 ENCOUNTER — OFFICE VISIT (OUTPATIENT)
Dept: FAMILY MEDICINE CLINIC | Facility: CLINIC | Age: 48
End: 2018-01-10

## 2018-01-10 ENCOUNTER — LAB ENCOUNTER (OUTPATIENT)
Dept: LAB | Age: 48
End: 2018-01-10
Attending: PHYSICIAN ASSISTANT
Payer: COMMERCIAL

## 2018-01-10 VITALS
HEIGHT: 66 IN | HEART RATE: 74 BPM | SYSTOLIC BLOOD PRESSURE: 122 MMHG | DIASTOLIC BLOOD PRESSURE: 78 MMHG | RESPIRATION RATE: 16 BRPM | WEIGHT: 241 LBS | BODY MASS INDEX: 38.73 KG/M2

## 2018-01-10 DIAGNOSIS — D64.9 ANEMIA, UNSPECIFIED TYPE: ICD-10-CM

## 2018-01-10 DIAGNOSIS — R42 VERTIGO: ICD-10-CM

## 2018-01-10 DIAGNOSIS — M25.511 ACUTE PAIN OF RIGHT SHOULDER: Primary | ICD-10-CM

## 2018-01-10 LAB
ALBUMIN SERPL-MCNC: 3.6 G/DL (ref 3.5–4.8)
ALP LIVER SERPL-CCNC: 75 U/L (ref 39–100)
ALT SERPL-CCNC: 39 U/L (ref 14–54)
AST SERPL-CCNC: 28 U/L (ref 15–41)
BASOPHILS # BLD AUTO: 0.09 X10(3) UL (ref 0–0.1)
BASOPHILS NFR BLD AUTO: 1.3 %
BILIRUB SERPL-MCNC: 0.5 MG/DL (ref 0.1–2)
BUN BLD-MCNC: 9 MG/DL (ref 8–20)
CALCIUM BLD-MCNC: 8.9 MG/DL (ref 8.3–10.3)
CHLORIDE: 107 MMOL/L (ref 101–111)
CHOLEST SMN-MCNC: 204 MG/DL (ref ?–200)
CO2: 28 MMOL/L (ref 22–32)
CREAT BLD-MCNC: 0.74 MG/DL (ref 0.55–1.02)
DEPRECATED HBV CORE AB SER IA-ACNC: 51.4 NG/ML (ref 10–291)
EOSINOPHIL # BLD AUTO: 0.31 X10(3) UL (ref 0–0.3)
EOSINOPHIL NFR BLD AUTO: 4.3 %
ERYTHROCYTE [DISTWIDTH] IN BLOOD BY AUTOMATED COUNT: 15.9 % (ref 11.5–16)
GLUCOSE BLD-MCNC: 99 MG/DL (ref 70–99)
HCT VFR BLD AUTO: 38.6 % (ref 34–50)
HDLC SERPL-MCNC: 45 MG/DL (ref 45–?)
HDLC SERPL: 4.53 {RATIO} (ref ?–4.44)
HGB BLD-MCNC: 12.2 G/DL (ref 12–16)
IMMATURE GRANULOCYTE COUNT: 0.04 X10(3) UL (ref 0–1)
IMMATURE GRANULOCYTE RATIO %: 0.6 %
IRON SATURATION: 11 % (ref 13–45)
IRON: 49 UG/DL (ref 28–170)
LDLC SERPL CALC-MCNC: 119 MG/DL (ref ?–130)
LYMPHOCYTES # BLD AUTO: 2.22 X10(3) UL (ref 0.9–4)
LYMPHOCYTES NFR BLD AUTO: 31 %
M PROTEIN MFR SERPL ELPH: 7.3 G/DL (ref 6.1–8.3)
MCH RBC QN AUTO: 25.2 PG (ref 27–33.2)
MCHC RBC AUTO-ENTMCNC: 31.6 G/DL (ref 31–37)
MCV RBC AUTO: 79.6 FL (ref 81–100)
MONOCYTES # BLD AUTO: 0.56 X10(3) UL (ref 0.1–0.6)
MONOCYTES NFR BLD AUTO: 7.8 %
NEUTROPHIL ABS PRELIM: 3.94 X10 (3) UL (ref 1.3–6.7)
NEUTROPHILS # BLD AUTO: 3.94 X10(3) UL (ref 1.3–6.7)
NEUTROPHILS NFR BLD AUTO: 55 %
NONHDLC SERPL-MCNC: 159 MG/DL (ref ?–130)
PLATELET # BLD AUTO: 226 10(3)UL (ref 150–450)
POTASSIUM SERPL-SCNC: 3.8 MMOL/L (ref 3.6–5.1)
RBC # BLD AUTO: 4.85 X10(6)UL (ref 3.8–5.1)
RED CELL DISTRIBUTION WIDTH-SD: 45.1 FL (ref 35.1–46.3)
SODIUM SERPL-SCNC: 138 MMOL/L (ref 136–144)
TOTAL IRON BINDING CAPACITY: 460 UG/DL (ref 298–536)
TRANSFERRIN: 309 MG/DL (ref 200–360)
TRIGL SERPL-MCNC: 200 MG/DL (ref ?–150)
VLDLC SERPL CALC-MCNC: 40 MG/DL (ref 5–40)
WBC # BLD AUTO: 7.2 X10(3) UL (ref 4–13)

## 2018-01-10 PROCEDURE — 83540 ASSAY OF IRON: CPT | Performed by: PHYSICIAN ASSISTANT

## 2018-01-10 PROCEDURE — 80053 COMPREHEN METABOLIC PANEL: CPT | Performed by: PHYSICIAN ASSISTANT

## 2018-01-10 PROCEDURE — 82728 ASSAY OF FERRITIN: CPT | Performed by: PHYSICIAN ASSISTANT

## 2018-01-10 PROCEDURE — 36415 COLL VENOUS BLD VENIPUNCTURE: CPT | Performed by: PHYSICIAN ASSISTANT

## 2018-01-10 PROCEDURE — 80061 LIPID PANEL: CPT | Performed by: PHYSICIAN ASSISTANT

## 2018-01-10 PROCEDURE — 83550 IRON BINDING TEST: CPT | Performed by: PHYSICIAN ASSISTANT

## 2018-01-10 PROCEDURE — 85025 COMPLETE CBC W/AUTO DIFF WBC: CPT | Performed by: PHYSICIAN ASSISTANT

## 2018-01-10 PROCEDURE — 99214 OFFICE O/P EST MOD 30 MIN: CPT | Performed by: PHYSICIAN ASSISTANT

## 2018-01-10 RX ORDER — IPRATROPIUM BROMIDE 21 UG/1
2 SPRAY, METERED NASAL EVERY 12 HOURS
Qty: 1 BOTTLE | Refills: 0 | Status: SHIPPED | OUTPATIENT
Start: 2018-01-10 | End: 2018-06-14

## 2018-01-10 RX ORDER — MONTELUKAST SODIUM 10 MG/1
10 TABLET ORAL NIGHTLY
Qty: 90 TABLET | Refills: 1 | Status: SHIPPED | OUTPATIENT
Start: 2018-01-10 | End: 2019-07-02

## 2018-01-10 RX ORDER — METHYLPREDNISOLONE 4 MG/1
TABLET ORAL
Qty: 1 KIT | Refills: 0 | Status: SHIPPED | OUTPATIENT
Start: 2018-01-10 | End: 2018-06-14 | Stop reason: ALTCHOICE

## 2018-01-10 RX ORDER — ESCITALOPRAM OXALATE 20 MG/1
20 TABLET ORAL DAILY
Qty: 90 TABLET | Refills: 1 | Status: SHIPPED | OUTPATIENT
Start: 2018-01-10 | End: 2018-12-03

## 2018-01-10 RX ORDER — METHYLPREDNISOLONE 4 MG/1
TABLET ORAL
Qty: 1 KIT | Refills: 0 | Status: SHIPPED | OUTPATIENT
Start: 2018-01-10 | End: 2018-01-10

## 2018-01-10 RX ORDER — IPRATROPIUM BROMIDE 21 UG/1
2 SPRAY, METERED NASAL EVERY 12 HOURS
Qty: 1 BOTTLE | Refills: 0 | Status: SHIPPED | OUTPATIENT
Start: 2018-01-10 | End: 2018-01-10

## 2018-01-10 RX ORDER — ONDANSETRON 4 MG/1
1 TABLET, ORALLY DISINTEGRATING ORAL AS NEEDED
COMMUNITY
Start: 2017-12-15 | End: 2018-06-14

## 2018-01-10 RX ORDER — MECLIZINE HYDROCHLORIDE 25 MG/1
25 TABLET ORAL AS NEEDED
COMMUNITY
Start: 2017-12-15 | End: 2018-06-14

## 2018-01-10 NOTE — PATIENT INSTRUCTIONS
Thank you for choosing Cassandra Rubalcava PA-C at [place] [address]  To Do: Clotilde Osorio  1. Pt to begin medications as prescribed  2. Start physical therapy  3. Get lab work done  4.  Follow-up in 6 weeks, sooner if problems  Effective 6/19/17 un those discussed today.  All therapies have potential risk of harm or side effects or medication interactions.  It is your duty and for your safety to discuss with the pharmacist and our office with questions, and to notify us and stop treatment if problems

## 2018-01-10 NOTE — PROGRESS NOTES
[facility] Internal Medicine Progress Note    CC:  Patient presents with:  Urgent Care F/u: went to [facility] urgent care on 12/15/17 for vertigo - has lab results  Shoulder Pain: R shoulder x 3 weeks - fell on neal lilly on R shoulder  Medication R Albuterol Sulfate  (90 Base) MCG/ACT Inhalation Aero Soln Inhale 1 puff into the lungs every 6 (six) hours as needed for Wheezing.  Disp: 1 Inhaler Rfl: 3   Fluticasone Propionate (FLONASE) 50 MCG/ACT Nasal Suspension 2 sprays in each nostril daily cervical adenopathy. Neurological: She is alert and oriented to person, place, and time. Skin: Skin is warm and dry. Psychiatric: Mood and affect normal.   Vitals reviewed.         Assessment and Plan:  Acute pain of right shoulder  (primary encounter

## 2018-02-12 ENCOUNTER — TELEPHONE (OUTPATIENT)
Dept: FAMILY MEDICINE CLINIC | Facility: CLINIC | Age: 48
End: 2018-02-12

## 2018-04-04 DIAGNOSIS — G24.5 EYE TWITCH: ICD-10-CM

## 2018-04-05 NOTE — TELEPHONE ENCOUNTER
Sent patient a [employer] message to please make appointment.        Medication: Gabapentin 300mg     Date of last refill: 9/18/17  [place]     Last office visit: 5/15/17- EMG, 4/6/17  Due back to clinic per last office note

## 2018-04-11 RX ORDER — GABAPENTIN 300 MG/1
CAPSULE ORAL
Qty: 270 CAPSULE | OUTPATIENT
Start: 2018-04-11

## 2018-05-19 ENCOUNTER — HOSPITAL ENCOUNTER (EMERGENCY)
Age: 48
Discharge: HOME | End: 2018-05-19
Attending: EMERGENCY MEDICINE
Payer: COMMERCIAL

## 2018-05-19 VITALS
OXYGEN SATURATION: 97 % | SYSTOLIC BLOOD PRESSURE: 140 MMHG | HEIGHT: 65 IN | WEIGHT: 230 LBS | HEART RATE: 72 BPM | DIASTOLIC BLOOD PRESSURE: 75 MMHG | BODY MASS INDEX: 38.32 KG/M2 | RESPIRATION RATE: 18 BRPM | TEMPERATURE: 99 F

## 2018-05-19 DIAGNOSIS — L08.9 WOUND INFECTION: Primary | ICD-10-CM

## 2018-05-19 DIAGNOSIS — T14.8XXA WOUND INFECTION: Primary | ICD-10-CM

## 2018-05-19 PROCEDURE — 99283 EMERGENCY DEPT VISIT LOW MDM: CPT

## 2018-05-19 PROCEDURE — 87186 SC STD MICRODIL/AGAR DIL: CPT | Performed by: EMERGENCY MEDICINE

## 2018-05-19 PROCEDURE — 87070 CULTURE OTHR SPECIMN AEROBIC: CPT | Performed by: EMERGENCY MEDICINE

## 2018-05-19 PROCEDURE — 87077 CULTURE AEROBIC IDENTIFY: CPT | Performed by: EMERGENCY MEDICINE

## 2018-05-19 PROCEDURE — 87205 SMEAR GRAM STAIN: CPT | Performed by: EMERGENCY MEDICINE

## 2018-05-19 RX ORDER — CEPHALEXIN 500 MG/1
500 CAPSULE ORAL 4 TIMES DAILY
Qty: 20 CAPSULE | Refills: 0 | Status: SHIPPED | OUTPATIENT
Start: 2018-05-19 | End: 2018-05-24

## 2018-05-19 NOTE — ED INITIAL ASSESSMENT (HPI)
Pt here due to an weeping wound that is located between her breasts. Pt had stiches removed on Monday and the wound is draining and painful.

## 2018-05-19 NOTE — ED PROVIDER NOTES
Patient Seen in: [facility] Emergency Department In [place]    History   Patient presents with:  Cellulitis (integumentary, infectious)    Stated Complaint: wound evaluation for infection after suture removal last week     HPI    Right breast-patient stat Course     Labs Reviewed   AEROBIC BACTERIAL CULTURE       ED Course as of May 19 2124  ------------------------------------------------------------      MDM       Patient presents to the emergency department with dehiscence of the wound under her right br

## 2018-06-14 ENCOUNTER — OFFICE VISIT (OUTPATIENT)
Dept: NEUROLOGY | Facility: CLINIC | Age: 48
End: 2018-06-14

## 2018-06-14 VITALS
DIASTOLIC BLOOD PRESSURE: 83 MMHG | WEIGHT: 240 LBS | BODY MASS INDEX: 38.57 KG/M2 | RESPIRATION RATE: 16 BRPM | HEART RATE: 74 BPM | SYSTOLIC BLOOD PRESSURE: 121 MMHG | HEIGHT: 66 IN

## 2018-06-14 DIAGNOSIS — G51.4 FACIAL TWITCHING: ICD-10-CM

## 2018-06-14 DIAGNOSIS — G24.5 EYE TWITCH: Primary | ICD-10-CM

## 2018-06-14 PROCEDURE — 99214 OFFICE O/P EST MOD 30 MIN: CPT | Performed by: OTHER

## 2018-06-14 RX ORDER — GABAPENTIN 300 MG/1
300 CAPSULE ORAL 3 TIMES DAILY
Qty: 90 CAPSULE | Refills: 5 | Status: SHIPPED | OUTPATIENT
Start: 2018-06-14 | End: 2019-11-05

## 2018-06-14 NOTE — PATIENT INSTRUCTIONS
Please resume gabapentin at 300 mg tid - resume as follows - start at 300 mg nightly x 3 days, then increase to 300 mg bid x3 days, then increase to 300 mg tid   Have EEG done   Monitor for headache improvement as increasing gabapentin; if persistent, noti radiology tests such as MRI or CT scans, do not schedule the test until this office has notified you that the test has been approved by your insurer. Depending on your insurance carrier, approval may take 3-10 days.  It is highly recommended patients conta result in the delay of form completion. EEG INSTRUCTIONS:    TESTING:    · You will have 23 electrodes placed on your head for the EEG and 2 on your chest for an EKG tracing. · Head will be measured using a washable grease pencil.   · Head will be p

## 2018-06-14 NOTE — PROGRESS NOTES
[facility] Progress Note    HPI  Patient presents with:  Neurologic Problem: Follow up on eye twitched      As per my initial H&P from 3/6/17:  Digna Che Gaby Nicholson is a 48year old, who presents for evaluation of right eye twitching. Otherwise, patient denies any recent weight change, fevers, chills, nausea, double vision/ blurry vision / loss of vision, chest pain, palpitations, shortness of breath, rashes, joint pains, bowel / bladder incontinence or mood issues.  \"               Sin Alcohol use: Yes           0.0 oz/week       Comment: rarely    Drug use: No            Other Topics            Concern  Caffeine Concern        Yes    Comment:2-3 sodas a day  Exercise                No          Clindamycin             HIVES  Codeine Neck: supple, full range of motion; no carotid bruits    Fundoscopic Exam: optic discs sharp bilaterally    Neuro:  Mental status:  Orientation: Alert and oriented to person, place, time  Speech Fluent and conversational    CN: PERRL, EOMI with no nystagmu SERINE PROTEASE3, IGG      0-19 AU/mL 0   ANTI-NEUTROPHIL CYTO AB, IGG      <1:20 <1:20   T4,Free (Direct)      0.9-1.8 ng/dL 0.9   TSH      0.350-5.500 mIU/mL 1.190   HEMOGLOBIN A1c      <5.7 % 5.5   ESTIMATED AVERAGE GLUCOSE       mg/dL 111   CK 1. CT angiography of the head and neck are within normal limits. No regions of significant stenosis, occlusion or aneurysm.   2. Noncontrast CT scan of the head is also within normal limits without evidence of focal intracranial lesions, mass effect or midl 1.  Mild degenerative disc/osteophyte complex at C5-6 and C6-7 without central foraminal stenosis.      2. Minimal degenerative disc disease anteriorly within the mid to lower thoracic spine.       Other diagnostics:   New since last visit    EMG (5/15/17): R. First dorsal interosseous Ulnar C8-T1 N None None None None N N N N   R. Vastus medialis Femoral L2-L4 N None None None None N N N N   R. Peroneus longus Perineal L5-S1 N None None None None N N N N   R.  Tibialis anterior Deep peroneal (Fibular) L4-L5 N Symptomatically, she improved on gabapentin at 300 mg tid but has had recurrence of symptoms, since she has been out of the medication over the past month.   I have advised her to resume gabapentin, with a faster titration, starting at 300 mg nightly for 3

## 2018-10-30 ENCOUNTER — MED REC SCAN ONLY (OUTPATIENT)
Dept: FAMILY MEDICINE CLINIC | Facility: CLINIC | Age: 48
End: 2018-10-30

## 2018-11-12 RX ORDER — ESCITALOPRAM OXALATE 20 MG/1
TABLET ORAL
Qty: 30 TABLET | Refills: 0 | OUTPATIENT
Start: 2018-11-12

## 2018-11-12 NOTE — TELEPHONE ENCOUNTER
Requesting Escitalopram  LOV: 1/10/18  RTC: 6 lacey  Last Relevant Labs: n/a  Filled: 1/10/18 #90 with 1 refills    Future Appointments   Date Time Provider Lang Song   11/15/2018  3:10 PM Renu Clark MD [facility]-   12/14/2018 11:00

## 2018-11-13 ENCOUNTER — MED REC SCAN ONLY (OUTPATIENT)
Dept: FAMILY MEDICINE CLINIC | Facility: CLINIC | Age: 48
End: 2018-11-13

## 2018-11-24 ENCOUNTER — APPOINTMENT (OUTPATIENT)
Dept: CT IMAGING | Age: 48
End: 2018-11-24
Attending: EMERGENCY MEDICINE
Payer: COMMERCIAL

## 2018-11-24 ENCOUNTER — HOSPITAL ENCOUNTER (EMERGENCY)
Age: 48
Discharge: HOME | End: 2018-11-24
Attending: EMERGENCY MEDICINE
Payer: COMMERCIAL

## 2018-11-24 ENCOUNTER — APPOINTMENT (OUTPATIENT)
Dept: GENERAL RADIOLOGY | Age: 48
End: 2018-11-24
Attending: EMERGENCY MEDICINE
Payer: COMMERCIAL

## 2018-11-24 ENCOUNTER — NURSE ONLY (OUTPATIENT)
Dept: FAMILY MEDICINE CLINIC | Facility: CLINIC | Age: 48
End: 2018-11-24
Payer: COMMERCIAL

## 2018-11-24 VITALS
DIASTOLIC BLOOD PRESSURE: 74 MMHG | TEMPERATURE: 98 F | BODY MASS INDEX: 36.65 KG/M2 | HEIGHT: 65 IN | RESPIRATION RATE: 15 BRPM | WEIGHT: 220 LBS | HEART RATE: 86 BPM | OXYGEN SATURATION: 94 % | SYSTOLIC BLOOD PRESSURE: 151 MMHG

## 2018-11-24 VITALS
HEART RATE: 87 BPM | OXYGEN SATURATION: 97 % | HEIGHT: 65 IN | WEIGHT: 220 LBS | TEMPERATURE: 98 F | BODY MASS INDEX: 36.65 KG/M2 | DIASTOLIC BLOOD PRESSURE: 90 MMHG | SYSTOLIC BLOOD PRESSURE: 160 MMHG

## 2018-11-24 DIAGNOSIS — R07.89 CHEST PAIN, MUSCULOSKELETAL: Primary | ICD-10-CM

## 2018-11-24 DIAGNOSIS — R07.9 CHEST PAIN, UNSPECIFIED TYPE: ICD-10-CM

## 2018-11-24 DIAGNOSIS — M54.6 ACUTE RIGHT-SIDED THORACIC BACK PAIN: Primary | ICD-10-CM

## 2018-11-24 PROCEDURE — 71045 X-RAY EXAM CHEST 1 VIEW: CPT | Performed by: EMERGENCY MEDICINE

## 2018-11-24 PROCEDURE — 80053 COMPREHEN METABOLIC PANEL: CPT | Performed by: EMERGENCY MEDICINE

## 2018-11-24 PROCEDURE — 93005 ELECTROCARDIOGRAM TRACING: CPT

## 2018-11-24 PROCEDURE — 85025 COMPLETE CBC W/AUTO DIFF WBC: CPT | Performed by: EMERGENCY MEDICINE

## 2018-11-24 PROCEDURE — 99285 EMERGENCY DEPT VISIT HI MDM: CPT

## 2018-11-24 PROCEDURE — 96375 TX/PRO/DX INJ NEW DRUG ADDON: CPT

## 2018-11-24 PROCEDURE — 71275 CT ANGIOGRAPHY CHEST: CPT | Performed by: EMERGENCY MEDICINE

## 2018-11-24 PROCEDURE — 96374 THER/PROPH/DIAG INJ IV PUSH: CPT

## 2018-11-24 PROCEDURE — 93010 ELECTROCARDIOGRAM REPORT: CPT

## 2018-11-24 PROCEDURE — 85378 FIBRIN DEGRADE SEMIQUANT: CPT | Performed by: EMERGENCY MEDICINE

## 2018-11-24 PROCEDURE — 84484 ASSAY OF TROPONIN QUANT: CPT | Performed by: EMERGENCY MEDICINE

## 2018-11-24 RX ORDER — CYCLOBENZAPRINE HCL 10 MG
10 TABLET ORAL 3 TIMES DAILY PRN
Qty: 20 TABLET | Refills: 0 | Status: SHIPPED | OUTPATIENT
Start: 2018-11-24 | End: 2018-12-01

## 2018-11-24 RX ORDER — LORAZEPAM 2 MG/ML
1 INJECTION INTRAMUSCULAR ONCE
Status: COMPLETED | OUTPATIENT
Start: 2018-11-24 | End: 2018-11-24

## 2018-11-24 RX ORDER — KETOROLAC TROMETHAMINE 30 MG/ML
15 INJECTION, SOLUTION INTRAMUSCULAR; INTRAVENOUS ONCE
Status: COMPLETED | OUTPATIENT
Start: 2018-11-24 | End: 2018-11-24

## 2018-11-24 NOTE — PROGRESS NOTES
CHIEF COMPLAINT:     Patient presents with:  Back Pain      HPI:   Blanca Stevens is a 46year old female who presents with complaints of back pain and chest pain that began last night. The patient denies any injury or heavy lifting.   The patient re History    Tobacco Use      Smoking status: Never Smoker      Smokeless tobacco: Never Used    Alcohol use:  Yes      Alcohol/week: 0.0 oz      Comment: rarely    Drug use: No       REVIEW OF SYSTEMS:   GENERAL: Denies fever, chills,weight change, decreased murmur  EXTREMITIES: no cyanosis, clubbing or edema. Homans NEG. Dorsalis Pedis 2+. LYMPH:  No lymphadenopathy. BACK: No midline tenderness. No reproduction of pain with palpation of the trapezius or rhomboids on the right or left.   CHEST: Pain is n

## 2018-11-24 NOTE — ED PROVIDER NOTES
Patient Seen in: THE [facility] In [place]    History   Patient presents with:  Chest Pain Angina (cardiovascular)  Back Pain (musculoskeletal)    Stated Complaint: CHEST/BACK PAIN     HPI    This is a 47-year-old female complaining of ri moves.  HEENT exam within normal limits. Neck there is no lymphadenopathy or JVD  Lungs are clear to auscultation  Cardiovascular exam shows regular rate and rhythm without murmurs. Abdomen is soft and nontender.   Extremities there is no clubbing cyanosi ---------                               -----------         ------                     CBC W/ DIFFERENTIAL[[id number]]          Abnormal            Final result                 Please view results for these tests on the individual orders.    ERIK GALOL musculoskeletal  (primary encounter diagnosis)    Disposition:  Discharge  11/24/2018  7:04 pm    Follow-up:  ESTHER Tavarez [address]  [phone]    In 2 days          Medications Prescribed:  Current Discharge Me

## 2018-11-24 NOTE — ED INITIAL ASSESSMENT (HPI)
Patient c/o chest and back pain starting yesterday. Pain to sternum and radiates across chest and under right breast.  Back pain radiates along spine to lower back. +SOB. +nausea. Denies vomiting. Fractured tibula on 10/20.   Wearing boot to right le

## 2018-12-03 ENCOUNTER — OFFICE VISIT (OUTPATIENT)
Dept: FAMILY MEDICINE CLINIC | Facility: CLINIC | Age: 48
End: 2018-12-03
Payer: COMMERCIAL

## 2018-12-03 VITALS
SYSTOLIC BLOOD PRESSURE: 126 MMHG | HEIGHT: 65 IN | HEART RATE: 68 BPM | DIASTOLIC BLOOD PRESSURE: 74 MMHG | RESPIRATION RATE: 16 BRPM | WEIGHT: 220 LBS | TEMPERATURE: 97 F | BODY MASS INDEX: 36.65 KG/M2

## 2018-12-03 DIAGNOSIS — G24.5 EYE TWITCH: ICD-10-CM

## 2018-12-03 DIAGNOSIS — M25.571 ACUTE RIGHT ANKLE PAIN: ICD-10-CM

## 2018-12-03 DIAGNOSIS — Z01.818 PRE-OP EVALUATION: Primary | ICD-10-CM

## 2018-12-03 DIAGNOSIS — F32.A DEPRESSION, UNSPECIFIED DEPRESSION TYPE: ICD-10-CM

## 2018-12-03 PROCEDURE — 99214 OFFICE O/P EST MOD 30 MIN: CPT | Performed by: PHYSICIAN ASSISTANT

## 2018-12-03 RX ORDER — ESCITALOPRAM OXALATE 20 MG/1
20 TABLET ORAL DAILY
Qty: 90 TABLET | Refills: 1 | Status: SHIPPED | OUTPATIENT
Start: 2018-12-03 | End: 2019-08-05

## 2018-12-03 NOTE — PROGRESS NOTES
Preoperative History and Physical    CC:  Patient presents with:  Pre-Op Exam: rt ankle surgery/ 12/12/18/ Dr Bashir Late is 46year old presenting for a preoperative exam.    This patient is having surgery on date: 12/12/18 for proce Diabetes Paternal Grandmother    • Diabetes Paternal Grandfather        Allergies:    Clindamycin             HIVES  Codeine                 HIVES  Crabs (Crustaceans)     SWELLING    Comment:blistering  Levaquin [Levofloxa*    HIVES  Lobster Physical Exam   Constitutional: She is oriented to person, place, and time and well-developed, well-nourished, and in no distress.    HENT:   Right Ear: External ear normal.   Left Ear: External ear normal.   Nose: Nose normal.   Mouth/Throat: Oropharynx Therefore patient's risk of VTE is 3.0%    3-4 [moderate] Compression boots AND prophylactic anticoagulation During hospitalization      VTE prophylaxis per surgeon    Pulmonary Risk Assessment and Sleep Apnea        Substance Abuse and Smoking Risk see th

## 2018-12-03 NOTE — PATIENT INSTRUCTIONS
Thank you for choosing Ricarda Cifuentes PA-C at [place] [address]  To Do: Semaj Anderson  1.  Pt to follow-up after surgery    • Please signup for MY CHART, which is electronic access to your record if you have not done so.  All your results will [phone]  Please allow our office 5 business days to contact you regarding any testing results. Refill policies:   Allow 3 business days for refills; controlled substances may take longer and must be picked up from the office in person.   Narcotic me thromboembolism)    Insulin and steroids- case by case basis- but your doses may have to be adjusted sp talk to the doctor about it. Short acting insulin novolog humalog is held during surgery, Long acting is typically decreased before and during surgery. name a few need to have special consideration    Rheumatologic/Autoimmune disease need to be specially addressed and any condition that would impair healing such as HIV or history of splenectomy

## 2018-12-04 ENCOUNTER — TELEPHONE (OUTPATIENT)
Dept: FAMILY MEDICINE CLINIC | Facility: CLINIC | Age: 48
End: 2018-12-04

## 2018-12-04 NOTE — TELEPHONE ENCOUNTER
Earnestine from Dr. Quezada [place] office called for pre-op paperwork.  They have an opening tomorrow and would like to move her up but need pre-op clearance fax# [phone]

## 2018-12-04 NOTE — TELEPHONE ENCOUNTER
Faxed preop paperwork including H&P, EKG, and labs to [facility]. Fax #: [phone]. Fax confirmation received.

## 2019-01-02 ENCOUNTER — MED REC SCAN ONLY (OUTPATIENT)
Dept: FAMILY MEDICINE CLINIC | Facility: CLINIC | Age: 49
End: 2019-01-02

## 2019-03-29 ENCOUNTER — TELEPHONE (OUTPATIENT)
Dept: FAMILY MEDICINE CLINIC | Facility: CLINIC | Age: 49
End: 2019-03-29

## 2019-05-23 ENCOUNTER — TELEPHONE (OUTPATIENT)
Dept: FAMILY MEDICINE CLINIC | Facility: CLINIC | Age: 49
End: 2019-05-23

## 2019-06-25 ENCOUNTER — HOSPITAL ENCOUNTER (OUTPATIENT)
Dept: GENERAL RADIOLOGY | Age: 49
Discharge: HOME | End: 2019-06-25
Attending: PHYSICIAN ASSISTANT
Payer: COMMERCIAL

## 2019-06-25 ENCOUNTER — OFFICE VISIT (OUTPATIENT)
Dept: FAMILY MEDICINE CLINIC | Facility: CLINIC | Age: 49
End: 2019-06-25
Payer: COMMERCIAL

## 2019-06-25 VITALS
SYSTOLIC BLOOD PRESSURE: 132 MMHG | DIASTOLIC BLOOD PRESSURE: 90 MMHG | HEIGHT: 65 IN | TEMPERATURE: 97 F | HEART RATE: 83 BPM | BODY MASS INDEX: 39.76 KG/M2 | WEIGHT: 238.63 LBS

## 2019-06-25 DIAGNOSIS — M25.561 RIGHT KNEE PAIN, UNSPECIFIED CHRONICITY: ICD-10-CM

## 2019-06-25 DIAGNOSIS — R30.0 DYSURIA: ICD-10-CM

## 2019-06-25 DIAGNOSIS — R35.0 FREQUENCY OF URINATION: ICD-10-CM

## 2019-06-25 DIAGNOSIS — M25.561 RIGHT KNEE PAIN, UNSPECIFIED CHRONICITY: Primary | ICD-10-CM

## 2019-06-25 DIAGNOSIS — Z12.39 SCREENING FOR BREAST CANCER: ICD-10-CM

## 2019-06-25 PROCEDURE — 73560 X-RAY EXAM OF KNEE 1 OR 2: CPT | Performed by: PHYSICIAN ASSISTANT

## 2019-06-25 PROCEDURE — 81003 URINALYSIS AUTO W/O SCOPE: CPT | Performed by: PHYSICIAN ASSISTANT

## 2019-06-25 PROCEDURE — 99214 OFFICE O/P EST MOD 30 MIN: CPT | Performed by: PHYSICIAN ASSISTANT

## 2019-06-25 RX ORDER — SULFAMETHOXAZOLE AND TRIMETHOPRIM 800; 160 MG/1; MG/1
1 TABLET ORAL 2 TIMES DAILY
Qty: 14 TABLET | Refills: 0 | Status: SHIPPED | OUTPATIENT
Start: 2019-06-25 | End: 2019-08-05 | Stop reason: ALTCHOICE

## 2019-06-25 NOTE — PATIENT INSTRUCTIONS
Thank you for choosing Kita Muñiz PA-C at [place] [address]  To Do: Kianna Rodriguez  1. Patient to begin medications as prescribed  2. Patient to get x-rays  3. Patient to start physical therapy  4.   Patient follow-up as scheduled    • Pl called informing you that the insurance company approved your testing, please call Central Scheduling at [phone]  Please allow our office 5 business days to contact you regarding any testing results.     Refill policies:   Allow 3 business days for ref

## 2019-06-25 NOTE — PROGRESS NOTES
[facility] Progress Note    CC:  Patient presents with:  Knee Pain: right knee makes a crunching sound when walking  UTI: burning, back pain, feels the need to urinate, nausea, hot flashes      HPI:   HPI  Patient is a 49-year-o 10 MG Oral Tab Take 1 tablet (10 mg total) by mouth nightly. Disp: 90 tablet Rfl: 1   Albuterol Sulfate  (90 Base) MCG/ACT Inhalation Aero Soln Inhale 1 puff into the lungs every 6 (six) hours as needed for Wheezing.  Disp: 1 Inhaler Rfl: 3   Flutica Musculoskeletal:        Right knee: She exhibits normal range of motion, no swelling, no effusion, no ecchymosis, no deformity, no laceration, no erythema, normal alignment, no LCL laxity, no bony tenderness, normal meniscus and no MCL laxity.  No tendern apnea)

## 2019-06-27 NOTE — PROGRESS NOTES
Mild osteoarthritic changes  Otherwise normal xrays  Continue with current treatment plan and follow-up as directed

## 2019-07-02 ENCOUNTER — OFFICE VISIT (OUTPATIENT)
Dept: FAMILY MEDICINE CLINIC | Facility: CLINIC | Age: 49
End: 2019-07-02
Payer: COMMERCIAL

## 2019-07-02 VITALS
DIASTOLIC BLOOD PRESSURE: 90 MMHG | TEMPERATURE: 98 F | HEIGHT: 65 IN | BODY MASS INDEX: 39.79 KG/M2 | HEART RATE: 82 BPM | WEIGHT: 238.81 LBS | RESPIRATION RATE: 18 BRPM | SYSTOLIC BLOOD PRESSURE: 140 MMHG

## 2019-07-02 DIAGNOSIS — R03.0 ELEVATED BLOOD PRESSURE READING: ICD-10-CM

## 2019-07-02 DIAGNOSIS — Z00.00 WELLNESS EXAMINATION: Primary | ICD-10-CM

## 2019-07-02 DIAGNOSIS — E55.9 VITAMIN D DEFICIENCY: ICD-10-CM

## 2019-07-02 DIAGNOSIS — F32.A DEPRESSION, UNSPECIFIED DEPRESSION TYPE: ICD-10-CM

## 2019-07-02 DIAGNOSIS — Z01.89 ROUTINE LAB DRAW: ICD-10-CM

## 2019-07-02 DIAGNOSIS — Z12.11 SCREENING FOR COLON CANCER: ICD-10-CM

## 2019-07-02 DIAGNOSIS — N39.0 URINARY TRACT INFECTION WITHOUT HEMATURIA, SITE UNSPECIFIED: ICD-10-CM

## 2019-07-02 PROCEDURE — 99213 OFFICE O/P EST LOW 20 MIN: CPT | Performed by: PHYSICIAN ASSISTANT

## 2019-07-02 PROCEDURE — 99396 PREV VISIT EST AGE 40-64: CPT | Performed by: PHYSICIAN ASSISTANT

## 2019-07-02 RX ORDER — CIPROFLOXACIN 500 MG/1
500 TABLET, FILM COATED ORAL 2 TIMES DAILY
Qty: 10 TABLET | Refills: 0 | Status: SHIPPED | OUTPATIENT
Start: 2019-07-02 | End: 2019-08-05 | Stop reason: ALTCHOICE

## 2019-07-02 RX ORDER — MONTELUKAST SODIUM 10 MG/1
10 TABLET ORAL NIGHTLY
Qty: 90 TABLET | Refills: 1 | Status: SHIPPED | OUTPATIENT
Start: 2019-07-02 | End: 2019-11-19

## 2019-07-02 RX ORDER — FLUOXETINE 10 MG/1
10 TABLET, FILM COATED ORAL DAILY
Qty: 30 TABLET | Refills: 1 | Status: SHIPPED | OUTPATIENT
Start: 2019-07-02 | End: 2019-08-06 | Stop reason: ALTCHOICE

## 2019-07-02 RX ORDER — ESCITALOPRAM OXALATE 20 MG/1
20 TABLET ORAL DAILY
Qty: 90 TABLET | Refills: 1 | Status: CANCELLED | OUTPATIENT
Start: 2019-07-02

## 2019-07-02 NOTE — PATIENT INSTRUCTIONS
Thank you for choosing Junior Pisano PA-C at [place] [address]  To Do: Reese Smith  1. Patient to get lab work done  2. Referral for GI for colon cancer screening  3. Patient to begin Prozac, referral for [place]  4.   Begin monitoring blood from your insurance company.      Once our office has called informing you that the insurance company approved your testing, please call Central Scheduling at [phone]  Please allow our office 5 business days to contact you regarding any testing results

## 2019-07-02 NOTE — PROGRESS NOTES
REASON FOR VISIT:    Reese Smith is a 44year old female who presents for an [address]. Pt is doing well on lexapro when she takes it, she has been off it for a week. She does state she doesn't know if it is working as well.   Sh 04/25/2018   Flex Sigmoidoscopy Screen  Every 5 years No results found for this or any previous visit. Fecal Occult Blood  Annually No results found for: FOB, OCCULTSTOOL   Obesity Screening Screen all adults annually Body mass index is 39.74 kg/m². MG Oral Tab Take 1 tablet (500 mg total) by mouth 2 (two) times daily. Disp: 10 tablet Rfl: 0   Sulfamethoxazole-TMP DS (BACTRIM DS) 800-160 MG Oral Tab per tablet Take 1 tablet by mouth 2 (two) times daily.  Disp: 14 tablet Rfl: 0   escitalopram (LEXAPRO) dysuria, vaginal discharge or itching, periods regular, + frequency and urgency   MUSCULOSKELETAL: denies back pain  NEURO: denies headaches  PSYCHE: + depression and anxiety  HEMATOLOGIC: denies hx of anemia  ENDOCRINE: denies thyroid history  ALL/ASTHMA: infection without hematuria, site unspecified  Reviewed urine culture with patient  Will discontinue bactrim and begin cipro, discussed risks and side effects with possible allergy, pt is aware and understands.     Screening for colon cancer  -     GASTRO -

## 2019-07-03 LAB
ABSOLUTE BASOPHILS: 91 CELLS/UL (ref 0–200)
ABSOLUTE EOSINOPHILS: 281 CELLS/UL (ref 15–500)
ABSOLUTE LYMPHOCYTES: 2683 CELLS/UL (ref 850–3900)
ABSOLUTE MONOCYTES: 502 CELLS/UL (ref 200–950)
ABSOLUTE NEUTROPHILS: 4043 CELLS/UL (ref 1500–7800)
ALBUMIN/GLOBULIN RATIO: 1.4 (CALC) (ref 1–2.5)
ALBUMIN: 4.2 G/DL (ref 3.6–5.1)
ALKALINE PHOSPHATASE: 82 U/L (ref 33–130)
ALT: 19 U/L (ref 6–29)
AST: 16 U/L (ref 10–35)
BASOPHILS: 1.2 %
BILIRUBIN, TOTAL: 0.3 MG/DL (ref 0.2–1.2)
BUN: 10 MG/DL (ref 7–25)
CALCIUM: 9.2 MG/DL (ref 8.6–10.4)
CARBON DIOXIDE: 27 MMOL/L (ref 20–32)
CHLORIDE: 106 MMOL/L (ref 98–110)
CHOL/HDLC RATIO: 4 (CALC)
CHOLESTEROL, TOTAL: 194 MG/DL
CREATININE: 0.76 MG/DL (ref 0.5–1.05)
EGFR IF AFRICN AM: 105 ML/MIN/1.73M2
EGFR IF NONAFRICN AM: 91 ML/MIN/1.73M2
EOSINOPHILS: 3.7 %
GLOBULIN: 2.9 G/DL (CALC) (ref 1.9–3.7)
GLUCOSE: 99 MG/DL (ref 65–99)
HDL CHOLESTEROL: 49 MG/DL
HEMATOCRIT: 37.6 % (ref 35–45)
HEMOGLOBIN A1C: 5.9 % OF TOTAL HGB
HEMOGLOBIN: 12.1 G/DL (ref 11.7–15.5)
LDL-CHOLESTEROL: 120 MG/DL (CALC)
LYMPHOCYTES: 35.3 %
MCH: 25.5 PG (ref 27–33)
MCHC: 32.2 G/DL (ref 32–36)
MCV: 79.2 FL (ref 80–100)
MONOCYTES: 6.6 %
MPV: 11 FL (ref 7.5–12.5)
NEUTROPHILS: 53.2 %
NON-HDL CHOLESTEROL: 145 MG/DL (CALC)
PLATELET COUNT: 220 THOUSAND/UL (ref 140–400)
POTASSIUM: 4.2 MMOL/L (ref 3.5–5.3)
PROTEIN, TOTAL: 7.1 G/DL (ref 6.1–8.1)
RDW: 15 % (ref 11–15)
RED BLOOD CELL COUNT: 4.75 MILLION/UL (ref 3.8–5.1)
SODIUM: 142 MMOL/L (ref 135–146)
TRIGLYCERIDES: 131 MG/DL
VITAMIN B12: 255 PG/ML (ref 200–1100)
VITAMIN D, 25-OH, TOTAL: 25 NG/ML (ref 30–100)
WHITE BLOOD CELL COUNT: 7.6 THOUSAND/UL (ref 3.8–10.8)

## 2019-07-09 ENCOUNTER — TELEPHONE (OUTPATIENT)
Dept: FAMILY MEDICINE CLINIC | Facility: CLINIC | Age: 49
End: 2019-07-09

## 2019-07-09 RX ORDER — ERGOCALCIFEROL 1.25 MG/1
50000 CAPSULE ORAL WEEKLY
Qty: 12 CAPSULE | Refills: 0 | Status: SHIPPED | OUTPATIENT
Start: 2019-07-09 | End: 2019-08-08

## 2019-07-09 NOTE — PROGRESS NOTES
A1C is elevated, consistent with prediabetes, need to work on low carb diet, increase physical activity as tolerated to 30 min moderate physical activity most days of the week  Cholesterol is elevated as well  Low Vit D, begin 50,000 IU weekly x 12 weeks,

## 2019-07-09 NOTE — TELEPHONE ENCOUNTER
----- Message from Saranya Ward PA-C sent at 7/9/2019  7:17 AM CDT -----  A1C is elevated, consistent with prediabetes, need to work on low carb diet, increase physical activity as tolerated to 30 min moderate physical activity most days of the week

## 2019-07-30 ENCOUNTER — TELEPHONE (OUTPATIENT)
Dept: FAMILY MEDICINE CLINIC | Facility: CLINIC | Age: 49
End: 2019-07-30

## 2019-08-05 ENCOUNTER — TELEPHONE (OUTPATIENT)
Dept: FAMILY MEDICINE CLINIC | Facility: CLINIC | Age: 49
End: 2019-08-05

## 2019-08-05 NOTE — TELEPHONE ENCOUNTER
Future Appointments   Date Time Provider Lang Nohemi   8/6/2019  3:30 PM Benny Rai MD [facility]   8/9/2019  4:00 PM [facility] DIGITAL MAMMO 2 [facility]   9/5/2019  9:00 AM [facility] 20 NURSE [facility] 20 [facility] [address]   10/4/2019  9:00 AM

## 2019-08-05 NOTE — PATIENT INSTRUCTIONS
Thank you for choosing Derrick Toscano PA-C at [place] [address]  To Do: Lisa Valenzuela  1. Begin increased dose of Prozac, still not where he would like to be increased to 40 mg  2.   Continue to work on Cardback and increase physical acti approval from your insurance company.      Once our office has called informing you that the insurance company approved your testing, please call Central Scheduling at [phone]  Please allow our office 5 business days to contact you regarding any testin

## 2019-08-06 PROCEDURE — 87624 HPV HI-RISK TYP POOLED RSLT: CPT | Performed by: OBSTETRICS & GYNECOLOGY

## 2019-08-06 PROCEDURE — 88175 CYTOPATH C/V AUTO FLUID REDO: CPT | Performed by: OBSTETRICS & GYNECOLOGY

## 2019-08-06 NOTE — PROGRESS NOTES
[facility] Internal Medicine Progress Note    CC:  Patient presents with:  Medication Follow-Up: Prozac follow up. Pt says it's not enough.       HPI:   HPI  Pt is here to follow-up on starting Prozac  She states she doesn't feel any side effects Inhalation Aero Soln Inhale 1 puff into the lungs every 6 (six) hours as needed for Wheezing.  Disp: 1 Inhaler Rfl: 3   Fluticasone Propionate (FLONASE) 50 MCG/ACT Nasal Suspension 2 sprays in each nostril daily as needed Disp: 1 Bottle Rfl: 0     Gardenia qiu OV    Vitamin b12 deficiency  Pt received first B12 injection today    Vitamin d deficiency  Pt to begin 50,000 IU weekly x 12 weeks and then OTC 2,000 IU daily    Prediabetes  Discussed lab results with pt  Pt to decrease carbohydrates and increase protei

## 2019-09-05 ENCOUNTER — NURSE ONLY (OUTPATIENT)
Dept: FAMILY MEDICINE CLINIC | Facility: CLINIC | Age: 49
End: 2019-09-05
Payer: COMMERCIAL

## 2019-09-05 DIAGNOSIS — E53.8 VITAMIN B12 DEFICIENCY: Primary | ICD-10-CM

## 2019-09-05 PROCEDURE — 96372 THER/PROPH/DIAG INJ SC/IM: CPT | Performed by: PHYSICIAN ASSISTANT

## 2019-09-05 RX ORDER — CYANOCOBALAMIN 1000 UG/ML
1000 INJECTION INTRAMUSCULAR; SUBCUTANEOUS ONCE
Status: COMPLETED | OUTPATIENT
Start: 2019-09-05 | End: 2019-09-05

## 2019-09-05 RX ADMIN — CYANOCOBALAMIN 1000 MCG: 1000 INJECTION INTRAMUSCULAR; SUBCUTANEOUS at 08:53:00

## 2019-10-04 ENCOUNTER — NURSE ONLY (OUTPATIENT)
Dept: FAMILY MEDICINE CLINIC | Facility: CLINIC | Age: 49
End: 2019-10-04
Payer: COMMERCIAL

## 2019-10-04 DIAGNOSIS — E53.8 VITAMIN B12 DEFICIENCY: Primary | ICD-10-CM

## 2019-10-04 PROCEDURE — 96372 THER/PROPH/DIAG INJ SC/IM: CPT | Performed by: FAMILY MEDICINE

## 2019-10-04 RX ORDER — CYANOCOBALAMIN 1000 UG/ML
1000 INJECTION INTRAMUSCULAR; SUBCUTANEOUS ONCE
Status: COMPLETED | OUTPATIENT
Start: 2019-10-04 | End: 2019-10-04

## 2019-10-04 RX ADMIN — CYANOCOBALAMIN 1000 MCG: 1000 INJECTION INTRAMUSCULAR; SUBCUTANEOUS at 08:56:00

## 2019-10-22 RX ORDER — ERGOCALCIFEROL 1.25 MG/1
CAPSULE ORAL
Qty: 12 CAPSULE | Refills: 0 | OUTPATIENT
Start: 2019-10-22

## 2019-10-22 NOTE — TELEPHONE ENCOUNTER
Requested Prescriptions     Pending Prescriptions Disp Refills   • ERGOCALCIFEROL 05787 units Oral Cap [Pharmacy Med Name: VITAMIN D2 1.25MG(50,000 UNIT)] 12 capsule 0     Sig: TAKE ONE CAPSULE BY MOUTH ONCE A WEEK *AFTER RX FINISHED TAKE OTC VITAMIN D 200

## 2019-11-05 ENCOUNTER — OFFICE VISIT (OUTPATIENT)
Dept: FAMILY MEDICINE CLINIC | Facility: CLINIC | Age: 49
End: 2019-11-05
Payer: COMMERCIAL

## 2019-11-05 VITALS
SYSTOLIC BLOOD PRESSURE: 155 MMHG | HEART RATE: 64 BPM | DIASTOLIC BLOOD PRESSURE: 90 MMHG | TEMPERATURE: 98 F | HEIGHT: 65 IN | WEIGHT: 234 LBS | RESPIRATION RATE: 16 BRPM | BODY MASS INDEX: 38.99 KG/M2 | OXYGEN SATURATION: 99 %

## 2019-11-05 DIAGNOSIS — G24.5 EYE TWITCH: ICD-10-CM

## 2019-11-05 DIAGNOSIS — F33.1 MODERATE EPISODE OF RECURRENT MAJOR DEPRESSIVE DISORDER (HCC): ICD-10-CM

## 2019-11-05 DIAGNOSIS — I10 ESSENTIAL HYPERTENSION: Primary | ICD-10-CM

## 2019-11-05 DIAGNOSIS — F32.A ANXIETY AND DEPRESSION: ICD-10-CM

## 2019-11-05 DIAGNOSIS — Z23 FLU VACCINE NEED: ICD-10-CM

## 2019-11-05 DIAGNOSIS — K64.4 EXTERNAL HEMORRHOIDS: ICD-10-CM

## 2019-11-05 DIAGNOSIS — F41.9 ANXIETY AND DEPRESSION: ICD-10-CM

## 2019-11-05 PROCEDURE — 96127 BRIEF EMOTIONAL/BEHAV ASSMT: CPT | Performed by: FAMILY MEDICINE

## 2019-11-05 PROCEDURE — 99214 OFFICE O/P EST MOD 30 MIN: CPT | Performed by: FAMILY MEDICINE

## 2019-11-05 PROCEDURE — 90471 IMMUNIZATION ADMIN: CPT | Performed by: FAMILY MEDICINE

## 2019-11-05 PROCEDURE — 90686 IIV4 VACC NO PRSV 0.5 ML IM: CPT | Performed by: FAMILY MEDICINE

## 2019-11-05 RX ORDER — GABAPENTIN 300 MG/1
300 CAPSULE ORAL NIGHTLY
Qty: 90 CAPSULE | Refills: 0 | Status: SHIPPED | OUTPATIENT
Start: 2019-11-05 | End: 2020-01-28

## 2019-11-05 RX ORDER — LOSARTAN POTASSIUM AND HYDROCHLOROTHIAZIDE 12.5; 5 MG/1; MG/1
1 TABLET ORAL DAILY
Qty: 30 TABLET | Refills: 0 | Status: SHIPPED | OUTPATIENT
Start: 2019-11-05 | End: 2019-11-19

## 2019-11-05 RX ORDER — FLUOXETINE HYDROCHLORIDE 20 MG/1
20 CAPSULE ORAL 2 TIMES DAILY
Qty: 60 CAPSULE | Refills: 2 | Status: SHIPPED | OUTPATIENT
Start: 2019-11-05 | End: 2019-11-19

## 2019-11-05 RX ORDER — LOSARTAN POTASSIUM AND HYDROCHLOROTHIAZIDE 12.5; 5 MG/1; MG/1
1 TABLET ORAL DAILY
Qty: 90 TABLET | Refills: 3 | Status: CANCELLED | OUTPATIENT
Start: 2019-11-05 | End: 2020-10-30

## 2019-11-05 NOTE — PROGRESS NOTES
HPI:   Subjective Patient presents with: Follow - Up: prozac follow up. She takes 20mg daily and she would like to discuss taking another 20mg at night to helpl balance.   Hemorrhoids: She believes since her BP has been high and she thinks she is getting h resolved. ADLs have been normal.       She works as an  at [employer]. Her boss gives her stress and anxiety. Has financial stress. Her  quit his job. Step daughter is drug addict. Sister just moved in as well after her divorce.  Has fi (Few years), Headache disorder (Since 2007), Heartburn (Off snd on), Heavy menses (2007), Hemorrhoids (Off and on), History of depression (3-5 years), Irregular bowel habits (A month or so), Leaking of urine (2007), Night sweats (Rare), Pain in joints (5-6 chest pain, palpitations and leg swelling. Gastrointestinal: Negative for nausea, vomiting, abdominal pain, diarrhea, and abdominal distention. Genitourinary: Negative for dysuria, hematuria and difficulty urinating.    Musculoskeletal: Negative for luis person, place, and time. Skin: She is not diaphoretic. Psychiatric: Memory, affect and judgment normal. Her mood appears not anxious. She exhibits a depressed mood. She expresses no homicidal and no suicidal ideation.  She expresses no suicidal plans an visit:    Essential hypertension  -     Losartan Potassium-HCTZ 50-12.5 MG Oral Tab; Take 1 tablet by mouth daily. Eye twitch  -     gabapentin 300 MG Oral Cap; Take 1 capsule (300 mg total) by mouth nightly.     Anxiety and depression  -     FLUoxetine

## 2019-11-05 NOTE — PATIENT INSTRUCTIONS
Eating Heart-Healthy Food: Using the [address] for your heart doesn’t have to be hard or boring. You just need to know how to make healthier choices. The DASH eating plan has been developed to help you do just that.  DASH stands for Dietary Appr · 1 egg  Best choices: Lean poultry and fish. Trim away visible fat. Broil, grill, roast, or boil instead of frying. Remove skin from poultry before eating.  Limit how much red meat you eat.  Nuts, seeds, beans  Servings: 4 to 5 a week  A serving is:  · One Your blood pressure was high enough today to start treatment with medicines. Often healthcare providers don’t know what causes high blood pressure (hypertension). But it can be controlled with lifestyle changes and medicines.  High blood pressure usually ha ? Don’t eat foods that have a lot of salt. These include olives, pickles, smoked meats, and salted potato chips. ? Don’t add salt to your food at the table. ? Use only small amounts of salt when cooking. ?  Review food labels to track how much salt is in The American Heart Association recommends the following guidelines for home blood pressure monitoring:  · Don't smoke or drink coffee or other caffeinated drinks for 30 minutes before taking your blood pressure.   · Go to the bathroom before the test.  · Re Because a new blood pressure medicine was started today, it’s important that you have your blood pressure rechecked. This is to make sure that the medicine is working and that you have no serious side effects. Keep all your follow up appointments.  Write do

## 2019-11-06 ENCOUNTER — PATIENT MESSAGE (OUTPATIENT)
Dept: FAMILY MEDICINE CLINIC | Facility: CLINIC | Age: 49
End: 2019-11-06

## 2019-11-06 RX ORDER — HYDROCHLOROTHIAZIDE 12.5 MG/1
12.5 CAPSULE, GELATIN COATED ORAL DAILY
Qty: 30 CAPSULE | Refills: 0 | Status: SHIPPED | OUTPATIENT
Start: 2019-11-06 | End: 2019-11-19

## 2019-11-06 RX ORDER — LOSARTAN POTASSIUM 50 MG/1
50 TABLET ORAL DAILY
Qty: 30 TABLET | Refills: 0 | Status: SHIPPED | OUTPATIENT
Start: 2019-11-06 | End: 2019-11-19

## 2019-11-06 NOTE — TELEPHONE ENCOUNTER
From: Adrienne Stubbs  To: Valeri Porras DO  Sent: 11/6/2019 10:09 AM CST  Subject: Visit Follow-up Question    I haven't received a summary for my appointment yesterday. You recommended a diet I want to check out.      Also when I picked up prescriptions

## 2019-11-06 NOTE — TELEPHONE ENCOUNTER
1. DASH Plan- Provided to patient   2 Advised patient that RX was written for BID  3   Losartan Potassium-HCTZ 50-12.5 MG Oral Tab on backorder.  Can split script into two, pended if appropriate

## 2019-11-14 PROBLEM — D12.9 BENIGN NEOPLASM OF RECTUM AND ANAL CANAL: Status: ACTIVE | Noted: 2019-11-14

## 2019-11-14 PROBLEM — D12.2 BENIGN NEOPLASM OF ASCENDING COLON: Status: ACTIVE | Noted: 2019-11-14

## 2019-11-14 PROBLEM — R19.7 DIARRHEA: Status: ACTIVE | Noted: 2019-11-14

## 2019-11-14 PROBLEM — K92.1 MELENA: Status: ACTIVE | Noted: 2019-11-14

## 2019-11-14 PROBLEM — D12.8 BENIGN NEOPLASM OF RECTUM AND ANAL CANAL: Status: ACTIVE | Noted: 2019-11-14

## 2019-11-14 PROBLEM — D12.4 BENIGN NEOPLASM OF DESCENDING COLON: Status: ACTIVE | Noted: 2019-11-14

## 2019-11-19 ENCOUNTER — OFFICE VISIT (OUTPATIENT)
Dept: FAMILY MEDICINE CLINIC | Facility: CLINIC | Age: 49
End: 2019-11-19
Payer: COMMERCIAL

## 2019-11-19 VITALS
SYSTOLIC BLOOD PRESSURE: 126 MMHG | DIASTOLIC BLOOD PRESSURE: 74 MMHG | HEART RATE: 70 BPM | BODY MASS INDEX: 38.99 KG/M2 | RESPIRATION RATE: 16 BRPM | HEIGHT: 65 IN | TEMPERATURE: 98 F | OXYGEN SATURATION: 99 % | WEIGHT: 234 LBS

## 2019-11-19 DIAGNOSIS — I10 ESSENTIAL HYPERTENSION: Primary | ICD-10-CM

## 2019-11-19 DIAGNOSIS — J45.20 MILD INTERMITTENT REACTIVE AIRWAY DISEASE WITHOUT COMPLICATION: ICD-10-CM

## 2019-11-19 DIAGNOSIS — F32.A ANXIETY AND DEPRESSION: ICD-10-CM

## 2019-11-19 DIAGNOSIS — Z23 NEED FOR SHINGLES VACCINE: ICD-10-CM

## 2019-11-19 DIAGNOSIS — F41.9 ANXIETY AND DEPRESSION: ICD-10-CM

## 2019-11-19 DIAGNOSIS — K63.5 POLYP OF COLON, UNSPECIFIED PART OF COLON, UNSPECIFIED TYPE: ICD-10-CM

## 2019-11-19 PROCEDURE — 96127 BRIEF EMOTIONAL/BEHAV ASSMT: CPT | Performed by: FAMILY MEDICINE

## 2019-11-19 PROCEDURE — 90471 IMMUNIZATION ADMIN: CPT | Performed by: FAMILY MEDICINE

## 2019-11-19 PROCEDURE — 90750 HZV VACC RECOMBINANT IM: CPT | Performed by: FAMILY MEDICINE

## 2019-11-19 PROCEDURE — 99214 OFFICE O/P EST MOD 30 MIN: CPT | Performed by: FAMILY MEDICINE

## 2019-11-19 RX ORDER — MONTELUKAST SODIUM 10 MG/1
10 TABLET ORAL NIGHTLY
Qty: 90 TABLET | Refills: 1 | Status: SHIPPED | OUTPATIENT
Start: 2019-11-19 | End: 2021-09-23

## 2019-11-19 RX ORDER — HYDROCHLOROTHIAZIDE 12.5 MG/1
12.5 CAPSULE, GELATIN COATED ORAL DAILY
Qty: 90 CAPSULE | Refills: 0 | Status: SHIPPED | OUTPATIENT
Start: 2019-11-19 | End: 2020-03-16

## 2019-11-19 RX ORDER — ALBUTEROL SULFATE 90 UG/1
1 AEROSOL, METERED RESPIRATORY (INHALATION) EVERY 6 HOURS PRN
Qty: 1 INHALER | Refills: 3 | Status: SHIPPED | OUTPATIENT
Start: 2019-11-19

## 2019-11-19 RX ORDER — LOSARTAN POTASSIUM 50 MG/1
50 TABLET ORAL DAILY
Qty: 90 TABLET | Refills: 0 | Status: SHIPPED | OUTPATIENT
Start: 2019-11-19 | End: 2020-02-17

## 2019-11-19 RX ORDER — FLUOXETINE HYDROCHLORIDE 20 MG/1
20 CAPSULE ORAL 2 TIMES DAILY
Qty: 60 CAPSULE | Refills: 2 | Status: SHIPPED | OUTPATIENT
Start: 2019-11-19 | End: 2019-12-19

## 2019-11-19 NOTE — PATIENT INSTRUCTIONS
Controlling High Blood Pressure  High blood pressure (hypertension) is often called the silent killer. This is because many people who have it, don’t know it.  High blood pressure can raise your risk of heart attack, stroke, heart disease, and heart failu · Ask your healthcare provider what weight range is healthiest for you. If you are overweight, a weight loss of only 3% to 5% of your body weight can help lower blood pressure.  Generally, a good weight loss goal is to lose 10% of your body weight in a year

## 2019-11-19 NOTE — PROGRESS NOTES
Patient presents with: Follow - Up: BP follow up / prozac follow up. She is now taking prozac twice a day and is feeling much better. She is taking Losartan potassium 50mg and hydrochlorothiazide 12.5mg daily for blood pressure.  Health maintenance is up t 11/05/19 : 234 lb (106.1 kg)  09/19/19 : 236 lb (107 kg)  08/06/19 : 239 lb (108.4 kg)  08/05/19 : 237 lb (107.5 kg)  07/02/19 : 238 lb 12.8 oz (108.3 kg)          Review of Systems   Constitutional: Negative for fever, chills and fatigue. No distress.   HE unspecified Month ago    Have had soft stool for a while   • Dizziness Off and on. I get vertigo   • Essential hypertension 11/21/2019   • Exposure to TB Not sure    Spots on lungs.    • Extrinsic asthma, unspecified    • Fatigue Fewmonths    Seem to be frequency: Never      Comment: Rarely    Drug use: Never      Current Outpatient Medications   Medication Sig Dispense Refill   • FLUoxetine HCl 20 MG Oral Cap Take 1 capsule (20 mg total) by mouth 2 (two) times daily.  60 capsule 2   • Montelukast Sodium 1 time. No distress. HEENT:  Normocephalic and atraumatic. Eyes: Conjunctivae and EOM are normal. PERRLA. No scleral icterus. Neck: Normal range of motion. Neck supple. Cardiovascular: Normal rate, regular rhythm and intact distal pulses.   No murmur, disease without complication  - Pt needs to monitor peak flows at home on a regular basis. Will continue with current medications. .  Asthma action reviewed with patient. Encouraged to check peak flows when not feeling well.   The patient indicates Chito Robledo life.  Blood pressure measurements are given as 2 numbers. Systolic blood pressure is the upper number. This is the pressure when the heart contracts. Diastolic blood pressure is the lower number. This is the pressure when the heart relaxes between beats. includes bicycling, dancing, walking, and jogging. · Park farther away from building entrances. · Use stairs instead of the elevator. · When you can, walk or bike instead of driving. · [place] leaves, garden, or do household repairs.   · Be active at a mod

## 2019-11-20 ENCOUNTER — TELEPHONE (OUTPATIENT)
Dept: FAMILY MEDICINE CLINIC | Facility: CLINIC | Age: 49
End: 2019-11-20

## 2019-11-20 NOTE — TELEPHONE ENCOUNTER
Per Dr Rand Barrera: Patient is experiencing side effects to vaccine probably, these are mil and common can happen with shingrix and will subside in a few days. Notify if patient develops rsh, hives, SOB, or throat swelling.

## 2019-11-20 NOTE — TELEPHONE ENCOUNTER
Patient states that she is following up on her Isentio message sent.    Site of injection is warm to touch and tender  Leg Right top of thigh- pain and extremely hot. +tender  Feels warm but unable to take temperature  +chills  +body aches  +headache  Sympt

## 2019-11-20 NOTE — TELEPHONE ENCOUNTER
Patient informed of MD recommendations below, patient verbalized understanding with intent to comply. She will call us if symptoms do not improve in a couple of days.  Educated patient on using ice to help with swelling and to go to the ER is adverse effect

## 2019-11-21 PROBLEM — R19.7 DIARRHEA: Status: RESOLVED | Noted: 2019-11-14 | Resolved: 2019-11-21

## 2019-11-21 PROBLEM — K92.1 MELENA: Status: RESOLVED | Noted: 2019-11-14 | Resolved: 2019-11-21

## 2019-11-21 PROBLEM — I10 ESSENTIAL HYPERTENSION: Status: ACTIVE | Noted: 2019-11-21

## 2020-01-16 ENCOUNTER — PATIENT MESSAGE (OUTPATIENT)
Dept: FAMILY MEDICINE CLINIC | Facility: CLINIC | Age: 50
End: 2020-01-16

## 2020-01-17 RX ORDER — LOSARTAN POTASSIUM 25 MG/1
50 TABLET ORAL DAILY
Qty: 180 TABLET | Refills: 0 | Status: SHIPPED | OUTPATIENT
Start: 2020-01-17 | End: 2020-04-08

## 2020-01-17 NOTE — TELEPHONE ENCOUNTER
Spoke with [facility] pharmacy, Losartan 50mg is on back order due to a recall. Pharmacy requesting new Rx for either Losartan 25mg or Losartan 100mg. Please advise.

## 2020-01-17 NOTE — TELEPHONE ENCOUNTER
From: Tanya Sharp  To: Micki Ang DO  Sent: 1/16/2020 9:14 PM CST  Subject: Prescription Question    My pharmacy has reached out regarding my high blood pressure medication. And haven't received any information back from the office.  I am now out o

## 2020-01-25 DIAGNOSIS — G24.5 EYE TWITCH: ICD-10-CM

## 2020-01-27 RX ORDER — FLUOXETINE HYDROCHLORIDE 20 MG/1
CAPSULE ORAL
Qty: 90 CAPSULE | Refills: 1 | OUTPATIENT
Start: 2020-01-27

## 2020-01-27 NOTE — TELEPHONE ENCOUNTER
Requested Prescriptions     Pending Prescriptions Disp Refills   • FLUOXETINE HCL 20 MG Oral Cap [Pharmacy Med Name: FLUOXETINE HCL 20 MG CAPSULE] 90 capsule 1     Sig: TAKE 1 CAPSULE BY MOUTH EVERY DAY     Non protocol medication    LOV: 11/19/2019 for f/

## 2020-01-28 RX ORDER — GABAPENTIN 300 MG/1
CAPSULE ORAL
Qty: 90 CAPSULE | Refills: 0 | Status: SHIPPED | OUTPATIENT
Start: 2020-01-28 | End: 2020-07-15

## 2020-01-28 NOTE — TELEPHONE ENCOUNTER
Requesting Gabapentin 300mg  LOV: 11/9/19  RTC: 3 months  Last Relevant Labs: annual labs done 7/2/19  Filled: 11/5/19 #90 with 0 refills    Future Appointments   Date Time Provider Lang Song   2/18/2020  8:00 AM Emma Haji, DO [facility]

## 2020-03-14 ENCOUNTER — PATIENT MESSAGE (OUTPATIENT)
Dept: FAMILY MEDICINE CLINIC | Facility: CLINIC | Age: 50
End: 2020-03-14

## 2020-03-14 DIAGNOSIS — I10 ESSENTIAL HYPERTENSION: ICD-10-CM

## 2020-03-16 RX ORDER — HYDROCHLOROTHIAZIDE 12.5 MG/1
12.5 CAPSULE, GELATIN COATED ORAL DAILY
Qty: 90 CAPSULE | Refills: 0 | Status: SHIPPED | OUTPATIENT
Start: 2020-03-16 | End: 2020-06-08

## 2020-03-16 NOTE — TELEPHONE ENCOUNTER
From: Woody Matias  To: Marv Jones DO  Sent: 3/14/2020 5:17 PM CDT  Subject: Prescription Question    My pharmacy has called to refill my hydrochlorothiazide 12.5mg but haven't gotten a response. I'm out now.  Can you please prescribe and send to th

## 2020-03-16 NOTE — TELEPHONE ENCOUNTER
Medication sent to the pharmacy. Advised patient to schedule appt to follow up before medication runs out.

## 2020-04-08 RX ORDER — LOSARTAN POTASSIUM 25 MG/1
TABLET ORAL
Qty: 180 TABLET | Refills: 0 | Status: SHIPPED | OUTPATIENT
Start: 2020-04-08 | End: 2020-07-15

## 2020-04-08 NOTE — TELEPHONE ENCOUNTER
Requested Prescriptions     Pending Prescriptions Disp Refills   • LOSARTAN POTASSIUM 25 MG Oral Tab [Pharmacy Med Name: LOSARTAN POTASSIUM 25 MG TAB] 180 tablet 0     Sig: TAKE 2 TABLETS BY MOUTH EVERY DAY     LOV: 11/19/19  RTC: 3 months  Last Relevant L

## 2020-06-06 DIAGNOSIS — I10 ESSENTIAL HYPERTENSION: ICD-10-CM

## 2020-06-08 RX ORDER — HYDROCHLOROTHIAZIDE 12.5 MG/1
CAPSULE, GELATIN COATED ORAL
Qty: 90 CAPSULE | Refills: 0 | Status: SHIPPED | OUTPATIENT
Start: 2020-06-08 | End: 2020-07-15

## 2020-06-08 NOTE — TELEPHONE ENCOUNTER
Hypertension Medications Protocol Failed6/6 9:37 AM   Appointment in past 6 or next 3 months    CMP or BMP in past 12 months    Last serum creatinine< 2.0     Refill protocol failed because the patient did not meet the protocol criteria.       Requested Pre

## 2020-07-15 ENCOUNTER — TELEMEDICINE (OUTPATIENT)
Dept: FAMILY MEDICINE CLINIC | Facility: CLINIC | Age: 50
End: 2020-07-15

## 2020-07-15 DIAGNOSIS — I10 ESSENTIAL HYPERTENSION: Primary | ICD-10-CM

## 2020-07-15 DIAGNOSIS — G24.5 EYE TWITCH: ICD-10-CM

## 2020-07-15 DIAGNOSIS — Z12.31 VISIT FOR SCREENING MAMMOGRAM: ICD-10-CM

## 2020-07-15 DIAGNOSIS — F33.42 RECURRENT MAJOR DEPRESSIVE DISORDER, IN FULL REMISSION (HCC): ICD-10-CM

## 2020-07-15 PROCEDURE — 99214 OFFICE O/P EST MOD 30 MIN: CPT | Performed by: FAMILY MEDICINE

## 2020-07-15 RX ORDER — HYDROCHLOROTHIAZIDE 12.5 MG/1
12.5 CAPSULE, GELATIN COATED ORAL DAILY
Qty: 90 CAPSULE | Refills: 1 | Status: SHIPPED | OUTPATIENT
Start: 2020-07-15 | End: 2021-03-12

## 2020-07-15 RX ORDER — FLUOXETINE HYDROCHLORIDE 20 MG/1
20 CAPSULE ORAL 2 TIMES DAILY
Qty: 180 CAPSULE | Refills: 0 | Status: SHIPPED | OUTPATIENT
Start: 2020-07-15 | End: 2020-10-09

## 2020-07-15 RX ORDER — GABAPENTIN 300 MG/1
300 CAPSULE ORAL NIGHTLY
Qty: 90 CAPSULE | Refills: 0 | Status: SHIPPED | OUTPATIENT
Start: 2020-07-15 | End: 2021-03-20

## 2020-07-15 RX ORDER — FLUOXETINE HYDROCHLORIDE 20 MG/1
CAPSULE ORAL
COMMUNITY
Start: 2020-01-01 | End: 2020-07-15

## 2020-07-15 RX ORDER — LOSARTAN POTASSIUM 25 MG/1
50 TABLET ORAL DAILY
Qty: 180 TABLET | Refills: 0 | Status: SHIPPED | OUTPATIENT
Start: 2020-07-15 | End: 2020-10-09

## 2020-07-15 NOTE — PROGRESS NOTES
Patient presents with:  Medication Follow-Up      Subjective    This visit is conducted using Telemedicine with live, interactive video and audio.     The patient confirmed knowledge of the limitations of the use of telemedicine were verbally confirmed by t Readings from Last 6 Encounters:  11/27/19 : 234 lb (106.1 kg)  11/19/19 : 234 lb (106.1 kg)  11/05/19 : 234 lb (106.1 kg)  09/19/19 : 236 lb (107 kg)  08/06/19 : 239 lb (108.4 kg)  08/05/19 : 237 lb (107.5 kg)          Review of Systems   Constitutional: Fatigue Fewmonths    Seem to be tired allthe time   • Feeling lonely Off and on    Take medication for this   • Flatulence/gas pain/belching Dependent on meals    Off and on   • Food intolerance Few years    Allergic to some foods.    • Headache disorder Si less      Drinks per session: 3 or 4      Binge frequency: Never      Comment: Rarely    Drug use: Never      Current Outpatient Medications   Medication Sig Dispense Refill   • hydrochlorothiazide 12.5 MG Oral Cap Take 1 capsule (12.5 mg total) by mouth d RESP: normal breathing, no distress  Psychiatric: Normal mood and affect. A/P:    1. Anxiety and depression  Anxiety - at goal, no suicidal or homicidal thoughts. Continue present management. Patient will call if any symptoms worsen.  Patient Ortega Ibrahim for this Visit:  Requested Prescriptions     Signed Prescriptions Disp Refills   • hydrochlorothiazide 12.5 MG Oral Cap 90 capsule 1     Sig: Take 1 capsule (12.5 mg total) by mouth daily.    • Losartan Potassium 25 MG Oral Tab 180 tablet 0     Sig: Take 2

## 2020-09-21 ENCOUNTER — HOSPITAL ENCOUNTER (OUTPATIENT)
Dept: GENERAL RADIOLOGY | Age: 50
Discharge: HOME | End: 2020-09-21
Attending: FAMILY MEDICINE
Payer: COMMERCIAL

## 2020-09-21 ENCOUNTER — OFFICE VISIT (OUTPATIENT)
Dept: FAMILY MEDICINE CLINIC | Facility: CLINIC | Age: 50
End: 2020-09-21
Payer: COMMERCIAL

## 2020-09-21 VITALS
TEMPERATURE: 98 F | RESPIRATION RATE: 16 BRPM | SYSTOLIC BLOOD PRESSURE: 128 MMHG | DIASTOLIC BLOOD PRESSURE: 76 MMHG | HEART RATE: 81 BPM | HEIGHT: 65 IN | BODY MASS INDEX: 37.99 KG/M2 | WEIGHT: 228 LBS

## 2020-09-21 DIAGNOSIS — M25.532 ACUTE PAIN OF LEFT WRIST: ICD-10-CM

## 2020-09-21 DIAGNOSIS — Z23 NEED FOR INFLUENZA VACCINATION: ICD-10-CM
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
## 2020-09-21 DIAGNOSIS — Z12.31 VISIT FOR SCREENING MAMMOGRAM: ICD-10-CM

## 2020-09-21 DIAGNOSIS — Z00.00 LABORATORY TESTS ORDERED AS PART OF A COMPLETE PHYSICAL EXAM (CPE): ICD-10-CM

## 2020-09-21 PROCEDURE — 90471 IMMUNIZATION ADMIN: CPT | Performed by: FAMILY MEDICINE

## 2020-09-21 PROCEDURE — 3008F BODY MASS INDEX DOCD: CPT | Performed by: FAMILY MEDICINE

## 2020-09-21 PROCEDURE — 3078F DIAST BP <80 MM HG: CPT | Performed by: FAMILY MEDICINE

## 2020-09-21 PROCEDURE — 90686 IIV4 VACC NO PRSV 0.5 ML IM: CPT | Performed by: FAMILY MEDICINE

## 2020-09-21 PROCEDURE — 99214 OFFICE O/P EST MOD 30 MIN: CPT | Performed by: FAMILY MEDICINE

## 2020-09-21 PROCEDURE — 73110 X-RAY EXAM OF WRIST: CPT | Performed by: FAMILY MEDICINE

## 2020-09-21 PROCEDURE — 3074F SYST BP LT 130 MM HG: CPT | Performed by: FAMILY MEDICINE

## 2020-09-21 RX ORDER — NAPROXEN 500 MG/1
500 TABLET ORAL 2 TIMES DAILY WITH MEALS
Qty: 20 TABLET | Refills: 0 | Status: SHIPPED | OUTPATIENT
Start: 2020-09-21 | End: 2020-10-01

## 2020-09-21 NOTE — PATIENT INSTRUCTIONS
Upper Extremity Bruise   You have a bruise (contusion) on your arm, wrist, hand, or fingers. Symptoms include pain, swelling, and skin discoloration. No bones are broken.  This injury may take from a few days to a few weeks to heal. During that time, the © 1749-1527 [employer]. [address] [facility] [place]. All rights reserved. This information is not intended as a substitute for professional medical care. Always follow your healthcare professional's instructions.          [employer]
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
## 2020-10-08 NOTE — TELEPHONE ENCOUNTER
Name from pharmacy: [place] 25 MG TAB         Will file in chart as: LOSARTAN POTASSIUM 25 MG Oral Tab    Sig: TAKE 2 TABLETS BY MOUTH EVERY DAY    Disp:  180 tablet    Refills:  0    Start: 10/7/2020    Class: Normal    Non-formulary    Last o

## 2020-10-09 RX ORDER — FLUOXETINE HYDROCHLORIDE 20 MG/1
CAPSULE ORAL
Qty: 180 CAPSULE | Refills: 0 | Status: SHIPPED | OUTPATIENT
Start: 2020-10-09 | End: 2021-03-12

## 2020-10-09 RX ORDER — LOSARTAN POTASSIUM 25 MG/1
TABLET ORAL
Qty: 180 TABLET | Refills: 0 | Status: SHIPPED | OUTPATIENT
Start: 2020-10-09 | End: 2021-03-12

## 2021-01-14 RX ORDER — FLUOXETINE HYDROCHLORIDE 20 MG/1
CAPSULE ORAL
Qty: 180 CAPSULE | Refills: 0 | OUTPATIENT
Start: 2021-01-14

## 2021-01-14 RX ORDER — LOSARTAN POTASSIUM 25 MG/1
TABLET ORAL
Qty: 180 TABLET | Refills: 0 | OUTPATIENT
Start: 2021-01-14

## 2021-02-13 RX ORDER — LOSARTAN POTASSIUM 25 MG/1
TABLET ORAL
Qty: 180 TABLET | Refills: 0 | OUTPATIENT
Start: 2021-02-13

## 2021-03-12 ENCOUNTER — TELEMEDICINE (OUTPATIENT)
Dept: FAMILY MEDICINE CLINIC | Facility: CLINIC | Age: 51
End: 2021-03-12

## 2021-03-12 DIAGNOSIS — Z12.31 VISIT FOR SCREENING MAMMOGRAM: ICD-10-CM

## 2021-03-12 DIAGNOSIS — I10 ESSENTIAL HYPERTENSION: Primary | ICD-10-CM

## 2021-03-12 DIAGNOSIS — Z00.00 LABORATORY TESTS ORDERED AS PART OF A COMPLETE PHYSICAL EXAM (CPE): ICD-10-CM

## 2021-03-12 PROCEDURE — 99213 OFFICE O/P EST LOW 20 MIN: CPT | Performed by: FAMILY MEDICINE

## 2021-03-12 RX ORDER — LOSARTAN POTASSIUM 25 MG/1
TABLET ORAL
Qty: 180 TABLET | Refills: 0 | OUTPATIENT
Start: 2021-03-12

## 2021-03-12 RX ORDER — FLUOXETINE HYDROCHLORIDE 20 MG/1
CAPSULE ORAL
Qty: 180 CAPSULE | Refills: 0 | Status: SHIPPED | OUTPATIENT
Start: 2021-03-12 | End: 2021-09-23

## 2021-03-12 RX ORDER — LOSARTAN POTASSIUM 25 MG/1
50 TABLET ORAL DAILY
Qty: 180 TABLET | Refills: 0 | Status: CANCELLED | OUTPATIENT
Start: 2021-03-12

## 2021-03-12 RX ORDER — LOSARTAN POTASSIUM AND HYDROCHLOROTHIAZIDE 12.5; 5 MG/1; MG/1
1 TABLET ORAL DAILY
Qty: 90 TABLET | Refills: 0 | Status: SHIPPED | OUTPATIENT
Start: 2021-03-12 | End: 2021-06-24

## 2021-03-12 NOTE — PROGRESS NOTES
Subjective    This visit is conducted using Telemedicine with live, interactive video and audio.     Chief Complaint:  Patient presents with:  Hypertension        The patient confirmed knowledge of the limitations of the use of telemedicine were verbally Headache disorder Since 2007    Since child birth   • Heartburn Off snd on    Off and on depending on meals   • Heavy menses 2007   • Hemorrhoids Off and on   • History of depression 3-5 years   • Indigestion Off and on.     Dependinrg on diet   • Irregular problem. Eyes: Negative for pain and visual disturbance. Respiratory: Negative for cough, chest tightness, shortness of breath and wheezing. Cardiovascular: Negative for chest pain, palpitations and leg swelling.    Gastrointestinal: Negative for na healthcare provider within 48-72 hours for reevaluation for persistent or worsening symptoms. Follow up for physical. Patient understands phone evaluation is not a substitute for face-to-face examination or emergency care.  Patient advised to go to ER

## 2021-03-14 NOTE — PATIENT INSTRUCTIONS
Established High Blood Pressure    High blood pressure (hypertension) is a long-term (chronic) disease. Often healthcare providers don’t know what causes it. But it can be caused by certain health conditions and medicines.   If you have high blood pressur include olives, pickles, smoked meats, and salted potato chips. ? Don’t add salt to your food at the table. ? Use only small amounts of salt when cooking.   · Start an exercise program. Talk with your healthcare provider about the type of exercise program blood pressure monitoring:  · Don't smoke or drink coffee for 30 minutes before taking your blood pressure. · Go to the bathroom before the test.  · Relax for 5 minutes before taking the measurement.   · Sit with your back supported (don't sit on a couch o Lolly last reviewed this educational content on 7/1/2019  © 2282-6138 [employer] [address]. All rights reserved. This information is not intended as a substitute for professional medical care.  Always follow your healthcare professional's instructio

## 2021-03-20 ENCOUNTER — OFFICE VISIT (OUTPATIENT)
Dept: FAMILY MEDICINE CLINIC | Facility: CLINIC | Age: 51
End: 2021-03-20
Payer: COMMERCIAL

## 2021-03-20 VITALS
WEIGHT: 247 LBS | BODY MASS INDEX: 41.15 KG/M2 | OXYGEN SATURATION: 99 % | TEMPERATURE: 98 F | SYSTOLIC BLOOD PRESSURE: 134 MMHG | HEART RATE: 77 BPM | DIASTOLIC BLOOD PRESSURE: 80 MMHG | RESPIRATION RATE: 16 BRPM | HEIGHT: 65 IN

## 2021-03-20 DIAGNOSIS — G24.5 EYE TWITCH: ICD-10-CM

## 2021-03-20 DIAGNOSIS — Z12.31 SCREENING MAMMOGRAM, ENCOUNTER FOR: ICD-10-CM

## 2021-03-20 DIAGNOSIS — Z00.00 WELL ADULT EXAM: Primary | ICD-10-CM

## 2021-03-20 DIAGNOSIS — M53.3 SACRAL BACK PAIN: ICD-10-CM

## 2021-03-20 DIAGNOSIS — M54.41 ACUTE BILATERAL LOW BACK PAIN WITH RIGHT-SIDED SCIATICA: ICD-10-CM

## 2021-03-20 DIAGNOSIS — I10 ESSENTIAL HYPERTENSION: ICD-10-CM

## 2021-03-20 DIAGNOSIS — J45.20 MILD INTERMITTENT REACTIVE AIRWAY DISEASE WITHOUT COMPLICATION: ICD-10-CM

## 2021-03-20 PROCEDURE — 3075F SYST BP GE 130 - 139MM HG: CPT | Performed by: FAMILY MEDICINE

## 2021-03-20 PROCEDURE — 99396 PREV VISIT EST AGE 40-64: CPT | Performed by: FAMILY MEDICINE

## 2021-03-20 PROCEDURE — 99213 OFFICE O/P EST LOW 20 MIN: CPT | Performed by: FAMILY MEDICINE

## 2021-03-20 PROCEDURE — 3079F DIAST BP 80-89 MM HG: CPT | Performed by: FAMILY MEDICINE

## 2021-03-20 PROCEDURE — 3008F BODY MASS INDEX DOCD: CPT | Performed by: FAMILY MEDICINE

## 2021-03-20 RX ORDER — NAPROXEN 500 MG/1
500 TABLET ORAL 2 TIMES DAILY WITH MEALS
Qty: 28 TABLET | Refills: 0 | Status: SHIPPED | OUTPATIENT
Start: 2021-03-20 | End: 2021-04-03

## 2021-03-20 RX ORDER — METHYLPREDNISOLONE 4 MG/1
TABLET ORAL
Qty: 1 KIT | Refills: 0 | Status: SHIPPED | OUTPATIENT
Start: 2021-03-20

## 2021-03-20 RX ORDER — MELATONIN
3 NIGHTLY
COMMUNITY

## 2021-03-20 RX ORDER — GABAPENTIN 300 MG/1
300 CAPSULE ORAL NIGHTLY
Qty: 90 CAPSULE | Refills: 0 | Status: SHIPPED | OUTPATIENT
Start: 2021-03-20 | End: 2021-09-23

## 2021-03-20 NOTE — PROGRESS NOTES
Patient presents with:  Physical: Routine annual physical- mammogram due. Labs due. Back Pain: She states she threw her back out last week- she is aware of additional charges.    Immunization/Injection: Shingles shot due -but she just had her covid vaccine Drugs: none   Sexual hx: 1 partner   STD hx: declined  Colonoscopy: 2019, polyp removed, due again in 2022. Review of Systems   Constitutional: Negative for fever, chills and fatigue. No distress.   HENT: Negative for hearing loss, congestion, sore th while   • Dizziness Off and on. I get vertigo   • Essential hypertension 11/21/2019   • Exposure to TB Not sure    Spots on lungs.    • Extrinsic asthma, unspecified    • Fatigue Fewmonths    Seem to be tired allthe time   • Feeling lonely Off and on Tobacco Use      Smoking status: Never Smoker      Smokeless tobacco: Never Used    Vaping Use      Vaping Use: Never used    Alcohol use: Not Currently      Alcohol/week: 0.0 standard drinks      Comment: Rarely    Drug use: Never      Current Outpatient 09/21/2020      Influenza(Afluria)0.5ml QIV PFS 3yr & older                          09/22/2018      TDAP                  02/17/2015      Zoster Vaccine Recombinant Adjuvanted (Shingrix)                          11/19/2019        Physical Exam  /80 1 kit; Refill: 0  - naproxen 500 MG Oral Tab; Take 1 tablet (500 mg total) by mouth 2 (two) times daily with meals for 14 days. Dispense: 28 tablet; Refill: 0  - CHIROPRACTIC  - INTERNAL    2.  Well adult exam  - -Discussed weight loss methods including di effects, and goals of treatment in detail.  - gabapentin 300 MG Oral Cap; Take 1 capsule (300 mg total) by mouth nightly. Dispense: 90 capsule;  Refill: 0    Orders Placed This Encounter      CBC [0916] [Q]      COMP METABOLIC PANEL [02795] [Q]      LIPID

## 2021-05-12 ENCOUNTER — HOSPITAL ENCOUNTER (OUTPATIENT)
Dept: MAMMOGRAPHY | Age: 51
Discharge: HOME | End: 2021-05-12
Attending: FAMILY MEDICINE
Payer: COMMERCIAL

## 2021-05-12 DIAGNOSIS — Z12.31 VISIT FOR SCREENING MAMMOGRAM: ICD-10-CM

## 2021-05-12 PROCEDURE — 77067 SCR MAMMO BI INCL CAD: CPT | Performed by: FAMILY MEDICINE

## 2021-05-12 PROCEDURE — 77063 BREAST TOMOSYNTHESIS BI: CPT | Performed by: FAMILY MEDICINE

## 2021-06-03 DIAGNOSIS — I10 ESSENTIAL HYPERTENSION: ICD-10-CM

## 2021-06-03 RX ORDER — FLUOXETINE HYDROCHLORIDE 20 MG/1
CAPSULE ORAL
Qty: 180 CAPSULE | Refills: 0 | OUTPATIENT
Start: 2021-06-03

## 2021-06-03 RX ORDER — LOSARTAN POTASSIUM AND HYDROCHLOROTHIAZIDE 12.5; 5 MG/1; MG/1
TABLET ORAL
Qty: 90 TABLET | Refills: 0 | OUTPATIENT
Start: 2021-06-03

## 2021-06-22 DIAGNOSIS — I10 ESSENTIAL HYPERTENSION: ICD-10-CM

## 2021-06-23 NOTE — TELEPHONE ENCOUNTER
Hypertension Medications Protocol Hgplgk2206/22/2021 03:09 PM   CMP or BMP in past 12 months Protocol Details    Last serum creatinine< 2.0     Appointment in past 6 or next 3 months      Refill protocol failed because the patient did not meet the protocol c

## 2021-06-24 DIAGNOSIS — I10 ESSENTIAL HYPERTENSION: ICD-10-CM

## 2021-06-24 RX ORDER — LOSARTAN POTASSIUM AND HYDROCHLOROTHIAZIDE 12.5; 5 MG/1; MG/1
1 TABLET ORAL DAILY
Qty: 90 TABLET | Refills: 1 | Status: SHIPPED | OUTPATIENT
Start: 2021-06-24 | End: 2021-09-23

## 2021-06-24 RX ORDER — LOSARTAN POTASSIUM AND HYDROCHLOROTHIAZIDE 12.5; 5 MG/1; MG/1
1 TABLET ORAL DAILY
Qty: 90 TABLET | Refills: 0 | Status: SHIPPED | OUTPATIENT
Start: 2021-06-24 | End: 2021-09-22

## 2021-09-20 DIAGNOSIS — G24.5 EYE TWITCH: ICD-10-CM

## 2021-09-21 NOTE — TELEPHONE ENCOUNTER
Requesting Fluoxetine 20mg  LOV: 3/20/21 Physical  RTC: prn  Last Relevant Labs: 6/22/21  Filled: 3/12/21 #180 with 0 refills    Requesting Gabapentin 300mg  LOV: 3/20/21 Physical  RTC: prn  Last Relevant Labs: 6/22/21  Filled: 3/20/21 #90 with 0 refills

## 2021-09-23 DIAGNOSIS — I10 ESSENTIAL HYPERTENSION: ICD-10-CM

## 2021-09-23 DIAGNOSIS — J45.20 MILD INTERMITTENT REACTIVE AIRWAY DISEASE WITHOUT COMPLICATION: ICD-10-CM

## 2021-09-23 RX ORDER — FLUOXETINE HYDROCHLORIDE 20 MG/1
CAPSULE ORAL
Qty: 180 CAPSULE | Refills: 0 | Status: SHIPPED | OUTPATIENT
Start: 2021-09-23 | End: 2021-12-15

## 2021-09-23 RX ORDER — GABAPENTIN 300 MG/1
CAPSULE ORAL
Qty: 90 CAPSULE | Refills: 0 | Status: SHIPPED | OUTPATIENT
Start: 2021-09-23 | End: 2022-01-25

## 2021-09-24 RX ORDER — LOSARTAN POTASSIUM AND HYDROCHLOROTHIAZIDE 12.5; 5 MG/1; MG/1
1 TABLET ORAL DAILY
Qty: 90 TABLET | Refills: 1 | Status: SHIPPED | OUTPATIENT
Start: 2021-09-24 | End: 2022-01-25

## 2021-09-24 RX ORDER — MONTELUKAST SODIUM 10 MG/1
10 TABLET ORAL NIGHTLY
Qty: 90 TABLET | Refills: 1 | Status: SHIPPED | OUTPATIENT
Start: 2021-09-24

## 2021-09-24 NOTE — TELEPHONE ENCOUNTER
montelukast 10 MG Oral Tab          Sig: Take 1 tablet (10 mg total) by mouth nightly.     Disp:  90 tablet    Refills:  1    Start: 9/23/2021    Class: Normal    Non-formulary For: Mild intermittent reactive airway disease without complication    Last orde

## 2021-11-01 ENCOUNTER — HOSPITAL ENCOUNTER (EMERGENCY)
Age: 51
Discharge: HOME | End: 2021-11-01
Payer: COMMERCIAL

## 2021-11-01 ENCOUNTER — APPOINTMENT (OUTPATIENT)
Dept: GENERAL RADIOLOGY | Age: 51
End: 2021-11-01
Payer: COMMERCIAL

## 2021-11-01 VITALS
RESPIRATION RATE: 16 BRPM | HEIGHT: 65 IN | BODY MASS INDEX: 39.99 KG/M2 | WEIGHT: 240 LBS | SYSTOLIC BLOOD PRESSURE: 142 MMHG | HEART RATE: 75 BPM | DIASTOLIC BLOOD PRESSURE: 76 MMHG | OXYGEN SATURATION: 100 % | TEMPERATURE: 98 F

## 2021-11-01 DIAGNOSIS — S90.32XA CONTUSION OF LEFT FOOT, INITIAL ENCOUNTER: Primary | ICD-10-CM

## 2021-11-01 PROCEDURE — 99283 EMERGENCY DEPT VISIT LOW MDM: CPT

## 2021-11-01 PROCEDURE — 73630 X-RAY EXAM OF FOOT: CPT

## 2021-11-01 RX ORDER — NAPROXEN 500 MG/1
500 TABLET ORAL 2 TIMES DAILY PRN
Qty: 20 TABLET | Refills: 0 | Status: SHIPPED | OUTPATIENT
Start: 2021-11-01 | End: 2021-11-08

## 2021-11-01 NOTE — ED INITIAL ASSESSMENT (HPI)
Carrying bags of paint and the bag broke and landed on left foot. Pain to top of foot and the last three toes.  Area is painful still, happened on saturday

## 2021-11-01 NOTE — ED PROVIDER NOTES
Patient Seen in: THE [facility] Emergency Department In [place]      History   Patient presents with:  Leg or Foot Injury    Stated Complaint: dropped a can of paint on left foot. Subjective:   HPI    Pleasant 18-year-old female.  2 days prior to arrival th maybe    Tooth   • Shortness of breath Off and on for 4-5 years   • Sleep apnea 3 years   • Sputum production Allergy season   • Stool incontinence     Soft consistency   • Stress 3-5 years   • Wears glasses 1980’s   • Wheezing               Past Surgical Saturday, 10/30/21. Patient complains of pain across her distal 2nd-5th metatarsals and has pain in her 4th and 5th toes. FINDINGS:  No evidence of acute displaced fracture or dislocation. Normal mineralization. Unremarkable soft tissues.   Mild degener

## 2021-11-02 ENCOUNTER — TELEPHONE (OUTPATIENT)
Dept: FAMILY MEDICINE CLINIC | Facility: CLINIC | Age: 51
End: 2021-11-02

## 2021-11-02 NOTE — TELEPHONE ENCOUNTER
[facility] for patient to call in regards to recent ER visit and to call office at [phone] if an appointment is needed.

## 2021-12-15 RX ORDER — FLUOXETINE HYDROCHLORIDE 20 MG/1
20 CAPSULE ORAL 2 TIMES DAILY
Qty: 90 CAPSULE | Refills: 0 | Status: SHIPPED | OUTPATIENT
Start: 2021-12-15 | End: 2022-01-24

## 2021-12-20 ENCOUNTER — LAB REQUISITION (OUTPATIENT)
Dept: LAB | Facility: HOSPITAL | Age: 51
End: 2021-12-20
Payer: COMMERCIAL

## 2021-12-20 DIAGNOSIS — M20.21 HALLUX RIGIDUS, RIGHT FOOT: ICD-10-CM

## 2021-12-20 PROCEDURE — 88305 TISSUE EXAM BY PATHOLOGIST: CPT | Performed by: PODIATRIST

## 2021-12-20 PROCEDURE — 88311 DECALCIFY TISSUE: CPT | Performed by: PODIATRIST

## 2022-01-24 RX ORDER — FLUOXETINE HYDROCHLORIDE 20 MG/1
CAPSULE ORAL
Qty: 90 CAPSULE | Refills: 0 | Status: SHIPPED | OUTPATIENT
Start: 2022-01-24

## 2022-01-25 DIAGNOSIS — G24.5 EYE TWITCH: ICD-10-CM

## 2022-01-25 DIAGNOSIS — I10 ESSENTIAL HYPERTENSION: ICD-10-CM

## 2022-01-25 RX ORDER — LOSARTAN POTASSIUM AND HYDROCHLOROTHIAZIDE 12.5; 5 MG/1; MG/1
1 TABLET ORAL DAILY
Qty: 90 TABLET | Refills: 1 | Status: SHIPPED | OUTPATIENT
Start: 2022-01-25

## 2022-01-25 RX ORDER — GABAPENTIN 300 MG/1
300 CAPSULE ORAL NIGHTLY
Qty: 90 CAPSULE | Refills: 0 | Status: SHIPPED | OUTPATIENT
Start: 2022-01-25

## 2022-03-09 RX ORDER — FLUOXETINE HYDROCHLORIDE 20 MG/1
CAPSULE ORAL
Qty: 90 CAPSULE | Refills: 0 | Status: SHIPPED | OUTPATIENT
Start: 2022-03-09

## 2022-03-09 NOTE — TELEPHONE ENCOUNTER
Requesting Fluoxetine 20mg  LOV: 3/20/21 Physical  RTC: 1 year  Last Relevant Labs: 6/22/21  Filled: 1/24/22 #90 with 0 refills    No future appointments.     Rx pended and routed for approval/denial

## 2022-06-26 DIAGNOSIS — J45.20 MILD INTERMITTENT REACTIVE AIRWAY DISEASE WITHOUT COMPLICATION: ICD-10-CM

## 2022-06-26 DIAGNOSIS — Z00.00 LABORATORY EXAMINATION ORDERED AS PART OF A ROUTINE GENERAL MEDICAL EXAMINATION: Primary | ICD-10-CM

## 2022-06-26 DIAGNOSIS — G24.5 EYE TWITCH: ICD-10-CM

## 2022-06-26 DIAGNOSIS — I10 ESSENTIAL HYPERTENSION: ICD-10-CM

## 2022-06-26 RX ORDER — MONTELUKAST SODIUM 10 MG/1
10 TABLET ORAL NIGHTLY
Qty: 90 TABLET | Refills: 1 | Status: CANCELLED | OUTPATIENT
Start: 2022-06-26

## 2022-06-27 NOTE — TELEPHONE ENCOUNTER
Pt will need refill on the Losartan but will have enough of the other three meds. She will have labs done prior to her upcoming appt.   Future Appointments   Date Time Provider Lang Song   7/5/2022  8:00 AM Santana Haji, DO [facility] [address] [facility] [address]

## 2022-06-29 RX ORDER — GABAPENTIN 300 MG/1
300 CAPSULE ORAL NIGHTLY
Qty: 30 CAPSULE | Refills: 0 | Status: SHIPPED | OUTPATIENT
Start: 2022-06-29

## 2022-06-29 RX ORDER — LOSARTAN POTASSIUM AND HYDROCHLOROTHIAZIDE 12.5; 5 MG/1; MG/1
1 TABLET ORAL DAILY
Qty: 30 TABLET | Refills: 0 | Status: SHIPPED | OUTPATIENT
Start: 2022-06-29

## 2022-06-29 RX ORDER — FLUOXETINE HYDROCHLORIDE 20 MG/1
20 CAPSULE ORAL 2 TIMES DAILY
Qty: 60 CAPSULE | Refills: 0 | Status: SHIPPED | OUTPATIENT
Start: 2022-06-29

## 2022-07-05 ENCOUNTER — OFFICE VISIT (OUTPATIENT)
Dept: FAMILY MEDICINE CLINIC | Facility: CLINIC | Age: 52
End: 2022-07-05
Payer: COMMERCIAL

## 2022-07-05 VITALS
HEART RATE: 77 BPM | BODY MASS INDEX: 39.49 KG/M2 | RESPIRATION RATE: 16 BRPM | SYSTOLIC BLOOD PRESSURE: 132 MMHG | TEMPERATURE: 98 F | HEIGHT: 65 IN | WEIGHT: 237 LBS | DIASTOLIC BLOOD PRESSURE: 80 MMHG | OXYGEN SATURATION: 99 %

## 2022-07-05 DIAGNOSIS — F33.42 RECURRENT MAJOR DEPRESSIVE DISORDER, IN FULL REMISSION (HCC): ICD-10-CM

## 2022-07-05 DIAGNOSIS — D12.4 BENIGN NEOPLASM OF DESCENDING COLON: ICD-10-CM

## 2022-07-05 DIAGNOSIS — Z23 NEED FOR SHINGLES VACCINE: ICD-10-CM

## 2022-07-05 DIAGNOSIS — I10 ESSENTIAL HYPERTENSION: ICD-10-CM

## 2022-07-05 DIAGNOSIS — Z12.31 ENCOUNTER FOR SCREENING MAMMOGRAM FOR MALIGNANT NEOPLASM OF BREAST: ICD-10-CM

## 2022-07-05 DIAGNOSIS — Z12.31 SCREENING MAMMOGRAM, ENCOUNTER FOR: ICD-10-CM

## 2022-07-05 DIAGNOSIS — F33.2 SEVERE EPISODE OF RECURRENT MAJOR DEPRESSIVE DISORDER, WITHOUT PSYCHOTIC FEATURES (HCC): ICD-10-CM

## 2022-07-05 DIAGNOSIS — G24.5 EYE TWITCH: ICD-10-CM

## 2022-07-05 DIAGNOSIS — Z00.00 WELL ADULT EXAM: Primary | ICD-10-CM

## 2022-07-05 DIAGNOSIS — J45.20 MILD INTERMITTENT REACTIVE AIRWAY DISEASE WITHOUT COMPLICATION: ICD-10-CM

## 2022-07-05 DIAGNOSIS — G47.33 OSA (OBSTRUCTIVE SLEEP APNEA): ICD-10-CM

## 2022-07-05 PROCEDURE — 3008F BODY MASS INDEX DOCD: CPT | Performed by: FAMILY MEDICINE

## 2022-07-05 PROCEDURE — 3079F DIAST BP 80-89 MM HG: CPT | Performed by: FAMILY MEDICINE

## 2022-07-05 PROCEDURE — 99396 PREV VISIT EST AGE 40-64: CPT | Performed by: FAMILY MEDICINE

## 2022-07-05 PROCEDURE — 3075F SYST BP GE 130 - 139MM HG: CPT | Performed by: FAMILY MEDICINE

## 2022-07-05 RX ORDER — ALBUTEROL SULFATE 90 UG/1
1 AEROSOL, METERED RESPIRATORY (INHALATION) EVERY 6 HOURS PRN
Qty: 18 G | Refills: 3 | Status: SHIPPED | OUTPATIENT
Start: 2022-07-05 | End: 2022-07-05

## 2022-07-05 RX ORDER — EPINEPHRINE 0.3 MG/.3ML
INJECTION SUBCUTANEOUS
COMMUNITY
Start: 2022-06-23

## 2022-07-05 RX ORDER — MONTELUKAST SODIUM 10 MG/1
10 TABLET ORAL NIGHTLY
Qty: 90 TABLET | Refills: 1 | Status: SHIPPED | OUTPATIENT
Start: 2022-07-05

## 2022-07-05 RX ORDER — ALBUTEROL SULFATE 90 UG/1
1 AEROSOL, METERED RESPIRATORY (INHALATION) EVERY 6 HOURS PRN
Qty: 18 G | Refills: 3 | Status: SHIPPED | OUTPATIENT
Start: 2022-07-05

## 2022-07-05 RX ORDER — FLUOXETINE HYDROCHLORIDE 20 MG/1
20 CAPSULE ORAL 2 TIMES DAILY
Qty: 60 CAPSULE | Refills: 0 | Status: SHIPPED | OUTPATIENT
Start: 2022-07-05 | End: 2022-07-05

## 2022-07-05 RX ORDER — GABAPENTIN 300 MG/1
300 CAPSULE ORAL NIGHTLY
Qty: 90 CAPSULE | Refills: 1 | Status: SHIPPED | OUTPATIENT
Start: 2022-07-05

## 2022-07-05 RX ORDER — GABAPENTIN 300 MG/1
300 CAPSULE ORAL NIGHTLY
Qty: 90 CAPSULE | Refills: 1 | Status: SHIPPED | OUTPATIENT
Start: 2022-07-05 | End: 2022-07-05

## 2022-07-05 RX ORDER — LOSARTAN POTASSIUM AND HYDROCHLOROTHIAZIDE 12.5; 5 MG/1; MG/1
1 TABLET ORAL DAILY
Qty: 90 TABLET | Refills: 1 | Status: SHIPPED | OUTPATIENT
Start: 2022-07-05

## 2022-07-05 RX ORDER — MONTELUKAST SODIUM 10 MG/1
10 TABLET ORAL NIGHTLY
Qty: 90 TABLET | Refills: 1 | Status: SHIPPED | OUTPATIENT
Start: 2022-07-05 | End: 2022-07-05

## 2022-07-05 RX ORDER — ALPRAZOLAM 0.5 MG/1
0.5 TABLET ORAL NIGHTLY PRN
Qty: 15 TABLET | Refills: 0 | Status: SHIPPED | OUTPATIENT
Start: 2022-07-05 | End: 2022-07-20

## 2022-07-05 RX ORDER — FLUOXETINE HYDROCHLORIDE 20 MG/1
20 CAPSULE ORAL 2 TIMES DAILY
Qty: 60 CAPSULE | Refills: 0 | Status: SHIPPED | OUTPATIENT
Start: 2022-07-05

## 2022-07-05 RX ORDER — LOSARTAN POTASSIUM AND HYDROCHLOROTHIAZIDE 12.5; 5 MG/1; MG/1
1 TABLET ORAL DAILY
Qty: 90 TABLET | Refills: 1 | Status: SHIPPED | OUTPATIENT
Start: 2022-07-05 | End: 2022-07-05

## 2022-07-06 LAB
ABSOLUTE BASOPHILS: 111 CELLS/UL (ref 0–200)
ABSOLUTE EOSINOPHILS: 264 CELLS/UL (ref 15–500)
ABSOLUTE LYMPHOCYTES: 2720 CELLS/UL (ref 850–3900)
ABSOLUTE MONOCYTES: 570 CELLS/UL (ref 200–950)
ABSOLUTE NEUTROPHILS: 4837 CELLS/UL (ref 1500–7800)
ALBUMIN/GLOBULIN RATIO: 1.5 (CALC) (ref 1–2.5)
ALBUMIN: 4.2 G/DL (ref 3.6–5.1)
ALKALINE PHOSPHATASE: 78 U/L (ref 37–153)
ALT: 18 U/L (ref 6–29)
AST: 14 U/L (ref 10–35)
BASOPHILS: 1.3 %
BILIRUBIN, TOTAL: 0.4 MG/DL (ref 0.2–1.2)
BUN: 13 MG/DL (ref 7–25)
CALCIUM: 9.5 MG/DL (ref 8.6–10.4)
CARBON DIOXIDE: 27 MMOL/L (ref 20–32)
CHLORIDE: 108 MMOL/L (ref 98–110)
CHOL/HDLC RATIO: 4.1 (CALC)
CHOLESTEROL, TOTAL: 210 MG/DL
CREATININE: 0.78 MG/DL (ref 0.5–1.05)
EGFR IF AFRICN AM: 100 ML/MIN/1.73M2
EGFR IF NONAFRICN AM: 86 ML/MIN/1.73M2
EOSINOPHILS: 3.1 %
GLOBULIN: 2.8 G/DL (CALC) (ref 1.9–3.7)
GLUCOSE: 101 MG/DL (ref 65–99)
HDL CHOLESTEROL: 51 MG/DL
HEMATOCRIT: 39.1 % (ref 35–45)
HEMOGLOBIN: 12.8 G/DL (ref 11.7–15.5)
LDL-CHOLESTEROL: 131 MG/DL (CALC)
LYMPHOCYTES: 32 %
MCH: 26.3 PG (ref 27–33)
MCHC: 32.7 G/DL (ref 32–36)
MCV: 80.3 FL (ref 80–100)
MONOCYTES: 6.7 %
MPV: 11.1 FL (ref 7.5–12.5)
NEUTROPHILS: 56.9 %
NON-HDL CHOLESTEROL: 159 MG/DL (CALC)
PLATELET COUNT: 239 THOUSAND/UL (ref 140–400)
POTASSIUM: 3.9 MMOL/L (ref 3.5–5.3)
PROTEIN, TOTAL: 7 G/DL (ref 6.1–8.1)
RDW: 14.7 % (ref 11–15)
RED BLOOD CELL COUNT: 4.87 MILLION/UL (ref 3.8–5.1)
SODIUM: 140 MMOL/L (ref 135–146)
TRIGLYCERIDES: 166 MG/DL
TSH W/REFLEX TO FT4: 2.37 MIU/L
WHITE BLOOD CELL COUNT: 8.5 THOUSAND/UL (ref 3.8–10.8)

## 2022-07-11 ENCOUNTER — TELEPHONE (OUTPATIENT)
Dept: FAMILY MEDICINE CLINIC | Facility: CLINIC | Age: 52
End: 2022-07-11

## 2022-07-11 NOTE — TELEPHONE ENCOUNTER
Pharmacy calling, insurance will not cover Ventolin inhaler, requesting to change prescription to Albuterol Proair.  Reference # [id number]

## 2022-07-11 NOTE — TELEPHONE ENCOUNTER
Albuterol Sulfate     Dispensed Written Strength Quantity Refills Days Supply Provider Pharmacy   ALBUTEROL HFA 90 MCG INHALER 07/05/2022 07/05/2022 90 MCG 8 Undefined  30 [facility]/pharmacy #[id number]. ..            EPINEPHrine       LMTCB to confirm if patient already picked up from [facility] or wants mail order

## 2022-07-12 NOTE — TELEPHONE ENCOUNTER
Pharmacy calling on status of prescription, requesting approval to change Ventolin inhaler to Albuterol Proair Reference #[id number]

## 2022-11-03 DIAGNOSIS — G24.5 EYE TWITCH: ICD-10-CM

## 2022-11-03 DIAGNOSIS — J45.20 MILD INTERMITTENT REACTIVE AIRWAY DISEASE WITHOUT COMPLICATION: ICD-10-CM

## 2022-11-03 DIAGNOSIS — I10 ESSENTIAL HYPERTENSION: ICD-10-CM

## 2022-11-06 RX ORDER — LOSARTAN POTASSIUM AND HYDROCHLOROTHIAZIDE 12.5; 5 MG/1; MG/1
1 TABLET ORAL DAILY
Qty: 90 TABLET | Refills: 1 | Status: SHIPPED | OUTPATIENT
Start: 2022-11-06

## 2022-11-06 RX ORDER — MONTELUKAST SODIUM 10 MG/1
10 TABLET ORAL NIGHTLY
Qty: 90 TABLET | Refills: 1 | Status: SHIPPED | OUTPATIENT
Start: 2022-11-06

## 2022-11-06 RX ORDER — GABAPENTIN 300 MG/1
300 CAPSULE ORAL NIGHTLY
Qty: 90 CAPSULE | Refills: 1 | Status: SHIPPED | OUTPATIENT
Start: 2022-11-06

## 2022-11-07 RX ORDER — GABAPENTIN 300 MG/1
300 CAPSULE ORAL NIGHTLY
Qty: 90 CAPSULE | Refills: 1 | OUTPATIENT
Start: 2022-11-07

## 2022-11-07 RX ORDER — LOSARTAN POTASSIUM AND HYDROCHLOROTHIAZIDE 12.5; 5 MG/1; MG/1
1 TABLET ORAL DAILY
Qty: 90 TABLET | Refills: 1 | OUTPATIENT
Start: 2022-11-07

## 2022-11-07 RX ORDER — MONTELUKAST SODIUM 10 MG/1
10 TABLET ORAL NIGHTLY
Qty: 90 TABLET | Refills: 1 | OUTPATIENT
Start: 2022-11-07

## 2022-11-15 PROBLEM — Z83.719 FAMILY HISTORY OF COLONIC POLYPS: Status: ACTIVE | Noted: 2022-11-15

## 2022-11-15 PROBLEM — Z86.010 HISTORY OF ADENOMATOUS POLYP OF COLON: Status: ACTIVE | Noted: 2022-11-15

## 2022-11-15 PROBLEM — Z86.0101 HISTORY OF ADENOMATOUS POLYP OF COLON: Status: ACTIVE | Noted: 2022-11-15

## 2022-11-15 PROBLEM — Z83.71 FAMILY HISTORY OF COLONIC POLYPS: Status: ACTIVE | Noted: 2022-11-15

## 2023-03-10 RX ORDER — FLUOXETINE HYDROCHLORIDE 20 MG/1
CAPSULE ORAL
Qty: 60 CAPSULE | Refills: 11 | Status: SHIPPED | OUTPATIENT
Start: 2023-03-10

## 2023-04-21 ENCOUNTER — HOSPITAL ENCOUNTER (OUTPATIENT)
Dept: MAMMOGRAPHY | Age: 53
Discharge: HOME | End: 2023-04-21
Attending: FAMILY MEDICINE
Payer: COMMERCIAL

## 2023-04-21 DIAGNOSIS — Z12.31 ENCOUNTER FOR SCREENING MAMMOGRAM FOR MALIGNANT NEOPLASM OF BREAST: ICD-10-CM

## 2023-04-21 PROCEDURE — 77063 BREAST TOMOSYNTHESIS BI: CPT | Performed by: FAMILY MEDICINE

## 2023-04-21 PROCEDURE — 77067 SCR MAMMO BI INCL CAD: CPT | Performed by: FAMILY MEDICINE

## 2023-04-24 ENCOUNTER — PATIENT MESSAGE (OUTPATIENT)
Dept: FAMILY MEDICINE CLINIC | Facility: CLINIC | Age: 53
End: 2023-04-24

## 2023-04-24 DIAGNOSIS — N64.89 BREAST ASYMMETRY: Primary | ICD-10-CM

## 2023-04-24 DIAGNOSIS — R92.1 BREAST CALCIFICATION, RIGHT: ICD-10-CM

## 2023-05-01 ENCOUNTER — HOSPITAL ENCOUNTER (OUTPATIENT)
Dept: MAMMOGRAPHY | Age: 53
Discharge: HOME | End: 2023-05-01
Attending: FAMILY MEDICINE
Payer: COMMERCIAL

## 2023-05-01 ENCOUNTER — HOSPITAL ENCOUNTER (OUTPATIENT)
Dept: ULTRASOUND IMAGING | Age: 53
Discharge: HOME | End: 2023-05-01
Attending: FAMILY MEDICINE
Payer: COMMERCIAL

## 2023-05-01 DIAGNOSIS — R92.1 BREAST CALCIFICATION, RIGHT: ICD-10-CM

## 2023-05-01 DIAGNOSIS — N64.89 BREAST ASYMMETRY: ICD-10-CM

## 2023-05-01 PROCEDURE — 76642 ULTRASOUND BREAST LIMITED: CPT | Performed by: FAMILY MEDICINE

## 2023-05-01 PROCEDURE — 77065 DX MAMMO INCL CAD UNI: CPT | Performed by: FAMILY MEDICINE

## 2023-06-05 DIAGNOSIS — G24.5 EYE TWITCH: ICD-10-CM

## 2023-06-05 NOTE — TELEPHONE ENCOUNTER
Requested Renewals     Name from pharmacy: GABAPENTIN CAPS 300MG         Will file in chart as: GABAPENTIN 300 MG Oral Cap    Sig: TAKE 1 CAPSULE NIGHTLY    Disp: 90 capsule    Refills: 3    Start: 6/5/2023    Class: Normal    Non-formulary For: Eye twitch        No future appointments. LOV: 7/5/22 for physical exam  Labs done 7/5/22    -medication pended for review.

## 2023-06-06 RX ORDER — GABAPENTIN 300 MG/1
CAPSULE ORAL
Qty: 90 CAPSULE | Refills: 3 | Status: SHIPPED | OUTPATIENT
Start: 2023-06-06

## 2023-08-10 DIAGNOSIS — I10 ESSENTIAL HYPERTENSION: ICD-10-CM

## 2023-08-10 RX ORDER — LOSARTAN POTASSIUM AND HYDROCHLOROTHIAZIDE 12.5; 5 MG/1; MG/1
1 TABLET ORAL EVERY MORNING
Qty: 90 TABLET | Refills: 3 | OUTPATIENT
Start: 2023-08-10

## 2023-08-10 NOTE — TELEPHONE ENCOUNTER
Hypertension Medications Protocol Zahrfb35/10/2023 12:07 AM   Protocol Details CMP or BMP in past 12 months    Appointment in past 6 or next 3 months    Last serum creatinine< 2.0       Last time medication was refilled 11/6/22  Quantity and number of refills 90 w/ 1  Last OV 7/5/22  Next OV due       My chart message sent to patient to call office to schedule appointment. Medication  denied with message to pharmacy to call office.

## 2023-08-14 DIAGNOSIS — J45.20 MILD INTERMITTENT REACTIVE AIRWAY DISEASE WITHOUT COMPLICATION: ICD-10-CM

## 2023-08-14 NOTE — TELEPHONE ENCOUNTER
Requested Renewals     Name from pharmacy: MONTELUKAST SODIUM TABS 10MG         Will file in chart as: MONTELUKAST 10 MG Oral Tab    Sig: TAKE 1 TABLET NIGHTLY    Disp: 90 tablet    Refills: 3    Start: 8/14/2023    Class: Normal    Non-formulary For: Mild intermittent reactive airway disease without complication    Last ordered: 9 months ago by Nabil Coates DO Last refill: 5/16/2023    Rx #: [id number]    Asthma & COPD Medication Protocol [id number]/14/2023 12:39 AM    Asthma Action Score greater than or equal to 20    Appointment in past 6 or next 3 months    AAP/ACT given in last 12 months             No future appointments. LOV: 7/5/22 for physical exam    -please call patient to schedule annual exam.      -routed to PCP for review.

## 2023-08-15 RX ORDER — MONTELUKAST SODIUM 10 MG/1
10 TABLET ORAL NIGHTLY
Qty: 90 TABLET | Refills: 3 | Status: SHIPPED | OUTPATIENT
Start: 2023-08-15

## 2023-10-25 ENCOUNTER — APPOINTMENT (OUTPATIENT)
Dept: GENERAL RADIOLOGY | Facility: HOSPITAL | Age: 53
End: 2023-10-25
Attending: EMERGENCY MEDICINE
Payer: COMMERCIAL

## 2023-10-25 ENCOUNTER — HOSPITAL ENCOUNTER (OUTPATIENT)
Facility: HOSPITAL | Age: 53
Setting detail: OBSERVATION
Discharge: HOME | End: 2023-10-26
Attending: EMERGENCY MEDICINE | Admitting: HOSPITALIST
Payer: COMMERCIAL

## 2023-10-25 ENCOUNTER — HOSPITAL ENCOUNTER (INPATIENT)
Facility: HOSPITAL | Age: 53
LOS: 1 days | Discharge: HOME | End: 2023-10-26
Attending: EMERGENCY MEDICINE | Admitting: HOSPITALIST
Payer: COMMERCIAL

## 2023-10-25 DIAGNOSIS — E87.6 HYPOKALEMIA: ICD-10-CM

## 2023-10-25 DIAGNOSIS — J45.901 ASTHMA WITH ACUTE EXACERBATION, UNSPECIFIED ASTHMA SEVERITY, UNSPECIFIED WHETHER PERSISTENT: Primary | ICD-10-CM

## 2023-10-25 LAB
ALBUMIN SERPL-MCNC: 3.5 G/DL (ref 3.4–5)
ALBUMIN/GLOB SERPL: 0.9 {RATIO} (ref 1–2)
ALP LIVER SERPL-CCNC: 89 U/L
ALT SERPL-CCNC: 26 U/L
ANION GAP SERPL CALC-SCNC: 9 MMOL/L (ref 0–18)
AST SERPL-CCNC: 12 U/L (ref 15–37)
ATRIAL RATE: 93 BPM
BASOPHILS # BLD AUTO: 0.06 X10(3) UL (ref 0–0.2)
BASOPHILS NFR BLD AUTO: 0.6 %
BILIRUB SERPL-MCNC: 0.3 MG/DL (ref 0.1–2)
BUN BLD-MCNC: 11 MG/DL (ref 7–18)
CALCIUM BLD-MCNC: 9.3 MG/DL (ref 8.5–10.1)
CHLORIDE SERPL-SCNC: 107 MMOL/L (ref 98–112)
CO2 SERPL-SCNC: 23 MMOL/L (ref 21–32)
CREAT BLD-MCNC: 1.12 MG/DL
EGFRCR SERPLBLD CKD-EPI 2021: 58 ML/MIN/1.73M2 (ref 60–?)
EOSINOPHIL # BLD AUTO: 0.17 X10(3) UL (ref 0–0.7)
EOSINOPHIL NFR BLD AUTO: 1.6 %
ERYTHROCYTE [DISTWIDTH] IN BLOOD BY AUTOMATED COUNT: 14.6 %
FLUAV + FLUBV RNA SPEC NAA+PROBE: NEGATIVE
FLUAV + FLUBV RNA SPEC NAA+PROBE: NEGATIVE
GLOBULIN PLAS-MCNC: 4 G/DL (ref 2.8–4.4)
GLUCOSE BLD-MCNC: 164 MG/DL (ref 70–99)
HCT VFR BLD AUTO: 41.1 %
HGB BLD-MCNC: 13.2 G/DL
IMM GRANULOCYTES # BLD AUTO: 0.06 X10(3) UL (ref 0–1)
IMM GRANULOCYTES NFR BLD: 0.6 %
LYMPHOCYTES # BLD AUTO: 1.31 X10(3) UL (ref 1–4)
LYMPHOCYTES NFR BLD AUTO: 12.5 %
MCH RBC QN AUTO: 26.2 PG (ref 26–34)
MCHC RBC AUTO-ENTMCNC: 32.1 G/DL (ref 31–37)
MCV RBC AUTO: 81.5 FL
MONOCYTES # BLD AUTO: 0.45 X10(3) UL (ref 0.1–1)
MONOCYTES NFR BLD AUTO: 4.3 %
NEUTROPHILS # BLD AUTO: 8.42 X10 (3) UL (ref 1.5–7.7)
NEUTROPHILS # BLD AUTO: 8.42 X10(3) UL (ref 1.5–7.7)
NEUTROPHILS NFR BLD AUTO: 80.4 %
OSMOLALITY SERPL CALC.SUM OF ELEC: 291 MOSM/KG (ref 275–295)
P AXIS: 54 DEGREES
P-R INTERVAL: 138 MS
PLATELET # BLD AUTO: 227 10(3)UL (ref 150–450)
POTASSIUM SERPL-SCNC: 3.1 MMOL/L (ref 3.5–5.1)
PROT SERPL-MCNC: 7.5 G/DL (ref 6.4–8.2)
Q-T INTERVAL: 380 MS
QRS DURATION: 94 MS
QTC CALCULATION (BEZET): 472 MS
R AXIS: 24 DEGREES
RBC # BLD AUTO: 5.04 X10(6)UL
RSV RNA SPEC NAA+PROBE: NEGATIVE
SARS-COV-2 RNA RESP QL NAA+PROBE: NOT DETECTED
SODIUM SERPL-SCNC: 139 MMOL/L (ref 136–145)
T AXIS: 31 DEGREES
TROPONIN I HIGH SENSITIVITY: 47 NG/L
VENTRICULAR RATE: 93 BPM
WBC # BLD AUTO: 10.5 X10(3) UL (ref 4–11)

## 2023-10-25 PROCEDURE — 71045 X-RAY EXAM CHEST 1 VIEW: CPT | Performed by: EMERGENCY MEDICINE

## 2023-10-25 PROCEDURE — 99223 1ST HOSP IP/OBS HIGH 75: CPT | Performed by: HOSPITALIST

## 2023-10-25 RX ORDER — ALBUTEROL SULFATE 2.5 MG/3ML
2.5 SOLUTION RESPIRATORY (INHALATION)
Status: DISCONTINUED | OUTPATIENT
Start: 2023-10-25 | End: 2023-10-26

## 2023-10-25 RX ORDER — PROCHLORPERAZINE EDISYLATE 5 MG/ML
5 INJECTION INTRAMUSCULAR; INTRAVENOUS EVERY 8 HOURS PRN
Status: DISCONTINUED | OUTPATIENT
Start: 2023-10-25 | End: 2023-10-26

## 2023-10-25 RX ORDER — ONDANSETRON 2 MG/ML
4 INJECTION INTRAMUSCULAR; INTRAVENOUS EVERY 6 HOURS PRN
Status: DISCONTINUED | OUTPATIENT
Start: 2023-10-25 | End: 2023-10-26

## 2023-10-25 RX ORDER — PREDNISONE 20 MG/1
40 TABLET ORAL DAILY
Status: DISCONTINUED | OUTPATIENT
Start: 2023-10-25 | End: 2023-10-26

## 2023-10-25 RX ORDER — SODIUM CHLORIDE 9 MG/ML
INJECTION, SOLUTION INTRAVENOUS CONTINUOUS
Status: ACTIVE | OUTPATIENT
Start: 2023-10-25 | End: 2023-10-25

## 2023-10-25 RX ORDER — POTASSIUM CHLORIDE 20 MEQ/1
40 TABLET, EXTENDED RELEASE ORAL ONCE
Status: COMPLETED | OUTPATIENT
Start: 2023-10-25 | End: 2023-10-25

## 2023-10-25 RX ORDER — FLUTICASONE PROPIONATE 50 MCG
1 SPRAY, SUSPENSION (ML) NASAL 2 TIMES DAILY
Status: DISCONTINUED | OUTPATIENT
Start: 2023-10-25 | End: 2023-10-26

## 2023-10-25 RX ORDER — MONTELUKAST SODIUM 10 MG/1
10 TABLET ORAL NIGHTLY
Status: DISCONTINUED | OUTPATIENT
Start: 2023-10-25 | End: 2023-10-26

## 2023-10-25 RX ORDER — ACETAMINOPHEN 325 MG/1
650 TABLET ORAL EVERY 6 HOURS PRN
Status: DISCONTINUED | OUTPATIENT
Start: 2023-10-25 | End: 2023-10-26

## 2023-10-25 RX ORDER — ENOXAPARIN SODIUM 100 MG/ML
0.5 INJECTION SUBCUTANEOUS DAILY
Status: DISCONTINUED | OUTPATIENT
Start: 2023-10-25 | End: 2023-10-26

## 2023-10-25 RX ORDER — FLUOXETINE HYDROCHLORIDE 20 MG/1
20 CAPSULE ORAL 2 TIMES DAILY
Status: DISCONTINUED | OUTPATIENT
Start: 2023-10-25 | End: 2023-10-26

## 2023-10-25 RX ORDER — LABETALOL HYDROCHLORIDE 5 MG/ML
10 INJECTION, SOLUTION INTRAVENOUS EVERY 4 HOURS PRN
Status: DISCONTINUED | OUTPATIENT
Start: 2023-10-25 | End: 2023-10-26

## 2023-10-25 NOTE — ED QUICK NOTES
Received report from previous RN. Pt ambulated to bathroom with steady gait. Aware we are waiting for room. Pt denies needing a neb at this time. Requesting food menu.

## 2023-10-25 NOTE — ED QUICK NOTES
Orders for admission, patient is aware of plan and ready to go upstairs. Any questions, please call ED RN mica at extension [phone].      Patient Covid vaccination status: Fully vaccinated     COVID Test Ordered in ED: SARS-CoV-2/Flu A and B/RSV by PCR (GeneXpert)    COVID Suspicion at Admission: N/A    Running Infusions:  None    Mental Status/LOC at time of transport: AXO x4    Other pertinent information:   CIWA score: N/A   NIH score:  N/A

## 2023-10-25 NOTE — WOUND PROGRESS NOTE
NURSING ADMISSION NOTE      Patient admitted via Cart  Oriented to room. Safety precautions initiated. Bed in low position. Call light in reach. A&OX4.   RA,VSS   Wears CPAP at night. RT notified. Denies having pain. Is SOB. Pulm consulted, will see pt tomorrow.

## 2023-10-25 NOTE — ED INITIAL ASSESSMENT (HPI)
Worsening SOB for three days. Denies sick contacts. [facility], 3 albuterol treatments, shot of steroids \"low oxygen level\" of 88%.

## 2023-10-26 ENCOUNTER — APPOINTMENT (OUTPATIENT)
Dept: CT IMAGING | Facility: HOSPITAL | Age: 53
End: 2023-10-26
Attending: INTERNAL MEDICINE
Payer: COMMERCIAL

## 2023-10-26 VITALS
OXYGEN SATURATION: 96 % | TEMPERATURE: 98 F | HEIGHT: 65 IN | BODY MASS INDEX: 41.65 KG/M2 | WEIGHT: 250 LBS | RESPIRATION RATE: 20 BRPM | SYSTOLIC BLOOD PRESSURE: 153 MMHG | DIASTOLIC BLOOD PRESSURE: 70 MMHG | HEART RATE: 92 BPM

## 2023-10-26 LAB
ADENOVIRUS PCR:: NOT DETECTED
ANION GAP SERPL CALC-SCNC: 10 MMOL/L (ref 0–18)
ATRIAL RATE: 105 BPM
B PARAPERT DNA SPEC QL NAA+PROBE: NOT DETECTED
B PERT DNA SPEC QL NAA+PROBE: NOT DETECTED
BUN BLD-MCNC: 16 MG/DL (ref 7–18)
C PNEUM DNA SPEC QL NAA+PROBE: NOT DETECTED
CALCIUM BLD-MCNC: 9.4 MG/DL (ref 8.5–10.1)
CHLORIDE SERPL-SCNC: 109 MMOL/L (ref 98–112)
CO2 SERPL-SCNC: 21 MMOL/L (ref 21–32)
CORONAVIRUS 229E PCR:: NOT DETECTED
CORONAVIRUS HKU1 PCR:: NOT DETECTED
CORONAVIRUS NL63 PCR:: NOT DETECTED
CORONAVIRUS OC43 PCR:: NOT DETECTED
CREAT BLD-MCNC: 1.04 MG/DL
EGFRCR SERPLBLD CKD-EPI 2021: 63 ML/MIN/1.73M2 (ref 60–?)
ERYTHROCYTE [DISTWIDTH] IN BLOOD BY AUTOMATED COUNT: 15 %
FLUAV RNA SPEC QL NAA+PROBE: NOT DETECTED
FLUBV RNA SPEC QL NAA+PROBE: NOT DETECTED
GLUCOSE BLD-MCNC: 176 MG/DL (ref 70–99)
HCT VFR BLD AUTO: 39.1 %
HGB BLD-MCNC: 12.5 G/DL
MCH RBC QN AUTO: 26.1 PG (ref 26–34)
MCHC RBC AUTO-ENTMCNC: 32 G/DL (ref 31–37)
MCV RBC AUTO: 81.6 FL
METAPNEUMOVIRUS PCR:: NOT DETECTED
MYCOPLASMA PNEUMONIA PCR:: NOT DETECTED
OSMOLALITY SERPL CALC.SUM OF ELEC: 295 MOSM/KG (ref 275–295)
P AXIS: 1 DEGREES
P-R INTERVAL: 142 MS
PARAINFLUENZA 1 PCR:: NOT DETECTED
PARAINFLUENZA 2 PCR:: NOT DETECTED
PARAINFLUENZA 3 PCR:: NOT DETECTED
PARAINFLUENZA 4 PCR:: NOT DETECTED
PLATELET # BLD AUTO: 252 10(3)UL (ref 150–450)
POTASSIUM SERPL-SCNC: 3.6 MMOL/L (ref 3.5–5.1)
Q-T INTERVAL: 380 MS
QRS DURATION: 100 MS
QTC CALCULATION (BEZET): 502 MS
R AXIS: 8 DEGREES
RBC # BLD AUTO: 4.79 X10(6)UL
RHINOVIRUS/ENTERO PCR:: DETECTED
RSV RNA SPEC QL NAA+PROBE: NOT DETECTED
SARS-COV-2 RNA NPH QL NAA+NON-PROBE: NOT DETECTED
SODIUM SERPL-SCNC: 140 MMOL/L (ref 136–145)
T AXIS: 47 DEGREES
VENTRICULAR RATE: 105 BPM
WBC # BLD AUTO: 23.2 X10(3) UL (ref 4–11)

## 2023-10-26 PROCEDURE — 71275 CT ANGIOGRAPHY CHEST: CPT | Performed by: INTERNAL MEDICINE

## 2023-10-26 PROCEDURE — 99239 HOSP IP/OBS DSCHRG MGMT >30: CPT | Performed by: HOSPITALIST

## 2023-10-26 PROCEDURE — 83036 HEMOGLOBIN GLYCOSYLATED A1C: CPT | Performed by: FAMILY MEDICINE

## 2023-10-26 PROCEDURE — 5A09357 ASSISTANCE WITH RESPIRATORY VENTILATION, LESS THAN 24 CONSECUTIVE HOURS, CONTINUOUS POSITIVE AIRWAY PRESSURE: ICD-10-PCS | Performed by: INTERNAL MEDICINE

## 2023-10-26 RX ORDER — FLUTICASONE FUROATE AND VILANTEROL 100; 25 UG/1; UG/1
1 POWDER RESPIRATORY (INHALATION) DAILY
Status: DISCONTINUED | OUTPATIENT
Start: 2023-10-26 | End: 2023-10-26

## 2023-10-26 RX ORDER — GUAIFENESIN 600 MG/1
600 TABLET, EXTENDED RELEASE ORAL 2 TIMES DAILY
Qty: 60 TABLET | Refills: 0 | Status: SHIPPED | OUTPATIENT
Start: 2023-10-26

## 2023-10-26 RX ORDER — IPRATROPIUM BROMIDE AND ALBUTEROL SULFATE 2.5; .5 MG/3ML; MG/3ML
3 SOLUTION RESPIRATORY (INHALATION)
Status: DISCONTINUED | OUTPATIENT
Start: 2023-10-26 | End: 2023-10-26

## 2023-10-26 RX ORDER — GUAIFENESIN 600 MG/1
600 TABLET, EXTENDED RELEASE ORAL 2 TIMES DAILY
Status: DISCONTINUED | OUTPATIENT
Start: 2023-10-26 | End: 2023-10-26

## 2023-10-26 RX ORDER — BENZONATATE 100 MG/1
100 CAPSULE ORAL 3 TIMES DAILY PRN
Status: DISCONTINUED | OUTPATIENT
Start: 2023-10-26 | End: 2023-10-26

## 2023-10-26 RX ORDER — PREDNISONE 20 MG/1
TABLET ORAL
Qty: 15 TABLET | Refills: 0 | Status: SHIPPED | OUTPATIENT
Start: 2023-10-26

## 2023-10-26 RX ORDER — FLUTICASONE FUROATE AND VILANTEROL 100; 25 UG/1; UG/1
1 POWDER RESPIRATORY (INHALATION) DAILY
Qty: 1 EACH | Refills: 0 | Status: SHIPPED | OUTPATIENT
Start: 2023-10-27

## 2023-10-26 NOTE — PLAN OF CARE
Assumed pt care at 1930  Aox4, 4L,   Tele-ST  Ambulatory  Continent  Cardiac diet  Cardiac electrolyte protocol  CHIDI CPAP protocol  Reported no n/v/d  Reported pain but refused pain meds  Safety precautions in place  Bed in lowest position and call light within reach  Pt and family member updated with POC  All needs met a this time      **pt was complaining of chest pain and pressure. Paged Dr Andres Kimball for pt update. Ordered Troponin, EKG, and Labetolol. **Paged Dr. Chris Owens for EKG and Troponin results.  Ordered CTA chest.        Problem: RESPIRATORY - ADULT  Goal: Achieves optimal ventilation and oxygenation  Description: INTERVENTIONS:  - Assess for changes in respiratory status  - Assess for changes in mentation and behavior  - Position to facilitate oxygenation and minimize respiratory effort  - Oxygen supplementation based on oxygen saturation or ABGs  - Provide Smoking Cessation handout, if applicable  - Encourage broncho-pulmonary hygiene including cough, deep breathe, Incentive Spirometry  - Assess the need for suctioning and perform as needed  - Assess and instruct to report SOB or any respiratory difficulty  - Respiratory Therapy support as indicated  - Manage/alleviate anxiety  - Monitor for signs/symptoms of CO2 retention  Outcome: Progressing

## 2023-10-27 ENCOUNTER — PATIENT OUTREACH (OUTPATIENT)
Dept: CASE MANAGEMENT | Age: 53
End: 2023-10-27

## 2023-10-27 DIAGNOSIS — Z02.9 ENCOUNTERS FOR UNSPECIFIED ADMINISTRATIVE PURPOSE: Primary | ICD-10-CM

## 2023-10-27 DIAGNOSIS — J45.901 ASTHMA WITH ACUTE EXACERBATION, UNSPECIFIED ASTHMA SEVERITY, UNSPECIFIED WHETHER PERSISTENT: ICD-10-CM

## 2023-10-27 NOTE — PROGRESS NOTES
Initial Post Discharge Follow Up   Discharge Date: 10/26/23  Contact Date: 10/27/2023    Consent Verification:  Assessment Completed With: Patient  HIPAA Verified? Yes    Discharge Dx:   SOB: 2/2 acute exacerbation of asthma from suspected viral URI     General:   How have you been since your discharge from the hospital? Pt reports she is feeling okay, better for sure. Pt feels her breathing is labored with activity still but much better. Pt reports she does recover quick when she rests. Pt does have a productive cough, phlegm is a white/light yellowish. Pt has a slight HA but feels it is due to coughing at times. Pt denies CP, worsening SOB, dizziness, weakness, fevers, confusion, n/v/d and pain. Pt is eating and drinking well, making sure to stay hydrated. Pt has ordered a pulse ox and is expecting it to arrive today. Pt has nebulizer at home. Pt currently does not have medication vials however would like to keep some on hand if needed. Pt also would like a rescue inhaler. NC to call Dr. Kody Jimenez office to , see below. Do you have any pain since discharge? No    How well was your pain managed while in the hospital?   On a scale of 1-5   1- Very Poor and 5- Very well   Very Well  When you were leaving the hospital were your discharge instructions reviewed with you? Yes  How well were your discharge instructions explained to you? On a scale of 1-5   1- Very Poor and 5- Very well   Very Well  Do you have any questions about your discharge instructions? No  Before leaving the hospital was your diagnoses explained to you? Yes  Do you have any questions about your diagnoses? No  Are you able to perform normal daily activities of living as you have prior to your hospital stay (dressing, bathing, ambulating to the bathroom, etc)? yes- taking her time and resting as needed.   (NC) Was patient given a different diet per AVS? no    Medications: Reviewed medication list with the patient. Medications are up to date. Current Outpatient Medications   Medication Sig Dispense Refill    fluticasone furoate-vilanterol 100-25 MCG/ACT Inhalation Aerosol Powder, Breath Activated Inhale 1 puff into the lungs daily. 1 each 0    guaiFENesin  MG Oral Tablet 12 Hr Take 1 tablet (600 mg total) by mouth 2 (two) times daily. 60 tablet 0    predniSONE 20 MG Oral Tab Take 2 tabs daily x5 days, then one tab daily x5 days 15 tablet 0    montelukast 10 MG Oral Tab Take 1 tablet (10 mg total) by mouth nightly. 90 tablet 3    FLUOXETINE 20 MG Oral Cap TAKE 1 CAPSULE TWICE A DAY 60 capsule 11    losartan-hydroCHLOROthiazide 50-12.5 MG Oral Tab Take 1 tablet by mouth in the morning. 90 tablet 1    Fluticasone Propionate (FLONASE) 50 MCG/ACT Nasal Suspension 2 sprays in each nostril daily as needed 1 Bottle 0     Were there any changes to your current medication(s) noted on the AVS? Yes  If so, were these medication changes discussed with you prior to leaving the hospital? Yes  If a new medication was prescribed:    Was the new medication's purpose & side effects reviewed? Yes  Do you have any questions about your new medication? No  Did you  your discharge medications when you left the hospital? Yes  Let's go over your medications together to make sure we are not missing anything. Medications Reviewed  Are there any reasons that keep you from taking your medication as prescribed? No  Are you having any concerns with constipation? No  Did patient receive their flu shot (Sept-March)? No    Discharge medications reviewed/discussed/and reconciled against outpatient medications with patient. Any changes or updates to medications sent to PCP. Patient Acknowledged     Referrals/orders at D/C:  Referrals/orders placed at D/C? no    DME ordered at D/C? No      Discharge orders, AVS reviewed and discussed with patient. Any changes or updates to orders sent to PCP.   Patient Acknowledged      [facility]   Transportation Needs: No Transportation Needs (10/27/2023)      Transportation Needs          Lack of Transportation: No    Financial Resource Strain: Low Risk  (10/27/2023)      Financial Resource Strain          Difficulty of Paying Living Expenses: Not very hard          Med Affordability: No    Follow up appointments:  Reviewed recommended follow up appointments. See below. Pt plans to schedule with Pulmonology as advised. NCM to contact Pulm and have RN call pt to discuss medications, pt plans to ask about an appt at that time. Pt denies any barriers to keeping/getting to HFU appts. Was TCC ordered:  No    If no TCC order or patient declined TCC appointment:  NCM Reviewed/Confirmed PCP office TCM appointment with patient   If no: Explain: Pt declined to schedule with PCP as she prefers to call the office herself. Does the patient have any other follow up appointment(s) needing to be scheduled? Yes  If yes: NCM reviewed upcoming specialist appointment with patient: Yes  Does the patient need assistance scheduling appointment(s): No    Is there any reason as to why you cannot make your appointment(s)? No     Needs post D/C:   Now that you are home, are there any needs or concerns you need addressed before your next visit with your PCP?  (DME, meds, questions, etc.): Yes- pt would like to have a rescue inhaler and breathing treatments at home incase it is needed. NCM to FU, see below. Interventions by NCM:   All d/c instructions reviewed with the pt. Reviewed when to call MD vs when to call 911 or go the ED. Reviewed s/s of worsening asthma. Educated pt on the importance of taking all meds as prescribed as well as close f/u with PCP/specialists. Pt verbalized understanding and will contact the office with any further questions or concerns.      Contacted Pulmonology ([phone]) to verify if pt should have a rescue inhaler and nebulizer treatments per pt's request. S/w Vinnie Cisneros, she is going to discuss pt's request with Dr. Wendy Conway and contact the pt back. [place] is aware pt has a nebulizer at home, would just need vials. Overall Rating:    How would you rate the care you received while in the hospital? good    CCM referral placed:    Not Applicable

## 2023-10-28 ENCOUNTER — TELEMEDICINE (OUTPATIENT)
Dept: FAMILY MEDICINE CLINIC | Facility: CLINIC | Age: 53
End: 2023-10-28

## 2023-10-28 DIAGNOSIS — Z09 HOSPITAL DISCHARGE FOLLOW-UP: Primary | ICD-10-CM

## 2023-10-28 DIAGNOSIS — J45.901 ASTHMA WITH ACUTE EXACERBATION, UNSPECIFIED ASTHMA SEVERITY, UNSPECIFIED WHETHER PERSISTENT: ICD-10-CM

## 2023-10-28 DIAGNOSIS — R73.9 ACUTE HYPERGLYCEMIA: ICD-10-CM

## 2023-10-28 LAB
EST. AVERAGE GLUCOSE BLD GHB EST-MCNC: 131 MG/DL (ref 68–126)
HBA1C MFR BLD: 6.2 % (ref ?–5.7)

## 2023-10-28 RX ORDER — ALBUTEROL SULFATE 90 UG/1
2 AEROSOL, METERED RESPIRATORY (INHALATION) EVERY 4 HOURS PRN
Qty: 8 G | Refills: 0 | Status: SHIPPED | OUTPATIENT
Start: 2023-10-28 | End: 2024-01-26

## 2023-11-23 DIAGNOSIS — I10 ESSENTIAL HYPERTENSION: ICD-10-CM

## 2023-12-13 RX ORDER — LOSARTAN POTASSIUM AND HYDROCHLOROTHIAZIDE 12.5; 5 MG/1; MG/1
1 TABLET ORAL DAILY
Qty: 90 TABLET | Refills: 0 | Status: SHIPPED
Start: 2023-12-13

## 2024-02-12 DIAGNOSIS — I10 ESSENTIAL HYPERTENSION: ICD-10-CM

## 2024-02-16 RX ORDER — LOSARTAN POTASSIUM AND HYDROCHLOROTHIAZIDE 12.5; 5 MG/1; MG/1
1 TABLET ORAL DAILY
Qty: 90 TABLET | Refills: 0 | Status: SHIPPED | OUTPATIENT
Start: 2024-02-16

## 2024-03-07 DIAGNOSIS — Z00.00 WELL ADULT EXAM: ICD-10-CM

## 2024-03-07 DIAGNOSIS — Z00.00 LABORATORY EXAMINATION ORDERED AS PART OF A ROUTINE GENERAL MEDICAL EXAMINATION: ICD-10-CM

## 2024-03-07 DIAGNOSIS — I10 ESSENTIAL HYPERTENSION: Primary | ICD-10-CM

## 2024-03-11 ENCOUNTER — OFFICE VISIT (OUTPATIENT)
Dept: FAMILY MEDICINE CLINIC | Facility: CLINIC | Age: 54
End: 2024-03-11
Payer: COMMERCIAL

## 2024-03-11 VITALS
HEART RATE: 67 BPM | SYSTOLIC BLOOD PRESSURE: 100 MMHG | HEIGHT: 65 IN | DIASTOLIC BLOOD PRESSURE: 71 MMHG | BODY MASS INDEX: 37.32 KG/M2 | RESPIRATION RATE: 16 BRPM | TEMPERATURE: 98 F | OXYGEN SATURATION: 97 % | WEIGHT: 224 LBS

## 2024-03-11 DIAGNOSIS — Z23 NEED FOR VACCINATION: ICD-10-CM

## 2024-03-11 DIAGNOSIS — Z86.010 HISTORY OF ADENOMATOUS POLYP OF COLON: ICD-10-CM

## 2024-03-11 DIAGNOSIS — Z00.00 WELL ADULT EXAM: Primary | ICD-10-CM

## 2024-03-11 DIAGNOSIS — J45.20 MILD INTERMITTENT REACTIVE AIRWAY DISEASE WITHOUT COMPLICATION (HCC): ICD-10-CM

## 2024-03-11 DIAGNOSIS — Z12.31 BREAST CANCER SCREENING BY MAMMOGRAM: ICD-10-CM

## 2024-03-11 DIAGNOSIS — Z23 ENCOUNTER FOR PREVNAR PNEUMOCOCCAL VACCINATION: ICD-10-CM

## 2024-03-11 DIAGNOSIS — F33.42 RECURRENT MAJOR DEPRESSIVE DISORDER, IN FULL REMISSION (HCC): ICD-10-CM

## 2024-03-11 DIAGNOSIS — I10 ESSENTIAL HYPERTENSION: ICD-10-CM

## 2024-03-11 DIAGNOSIS — G47.33 OSA (OBSTRUCTIVE SLEEP APNEA): ICD-10-CM

## 2024-03-11 PROBLEM — E87.6 HYPOKALEMIA: Status: RESOLVED | Noted: 2023-10-25 | Resolved: 2024-03-11

## 2024-03-11 PROCEDURE — 90677 PCV20 VACCINE IM: CPT | Performed by: FAMILY MEDICINE

## 2024-03-11 PROCEDURE — 99213 OFFICE O/P EST LOW 20 MIN: CPT | Performed by: FAMILY MEDICINE

## 2024-03-11 PROCEDURE — 99396 PREV VISIT EST AGE 40-64: CPT | Performed by: FAMILY MEDICINE

## 2024-03-11 PROCEDURE — 90471 IMMUNIZATION ADMIN: CPT | Performed by: FAMILY MEDICINE

## 2024-03-11 RX ORDER — GABAPENTIN 300 MG/1
300 CAPSULE ORAL NIGHTLY
Qty: 90 CAPSULE | Refills: 3 | Status: SHIPPED | OUTPATIENT
Start: 2024-03-11

## 2024-03-11 RX ORDER — MONTELUKAST SODIUM 10 MG/1
10 TABLET ORAL NIGHTLY
Qty: 90 TABLET | Refills: 3 | Status: SHIPPED | OUTPATIENT
Start: 2024-03-11

## 2024-03-11 RX ORDER — LOSARTAN POTASSIUM AND HYDROCHLOROTHIAZIDE 12.5; 5 MG/1; MG/1
1 TABLET ORAL DAILY
Qty: 90 TABLET | Refills: 3 | Status: SHIPPED | OUTPATIENT
Start: 2024-03-11

## 2024-03-11 RX ORDER — GABAPENTIN 300 MG/1
300 CAPSULE ORAL NIGHTLY
COMMUNITY
Start: 2024-01-29 | End: 2024-03-11

## 2024-03-11 RX ORDER — FLUOXETINE HYDROCHLORIDE 20 MG/1
20 CAPSULE ORAL NIGHTLY
Qty: 90 CAPSULE | Refills: 0 | Status: SHIPPED | OUTPATIENT
Start: 2024-03-11 | End: 2024-06-09

## 2024-03-11 RX ORDER — FLUTICASONE FUROATE AND VILANTEROL 100; 25 UG/1; UG/1
1 POWDER RESPIRATORY (INHALATION) DAILY
Qty: 60 EACH | Refills: 3 | Status: SHIPPED | OUTPATIENT
Start: 2024-03-11 | End: 2024-03-18

## 2024-03-11 RX ORDER — FLUOXETINE HYDROCHLORIDE 40 MG/1
40 CAPSULE ORAL DAILY
Qty: 90 CAPSULE | Refills: 0 | Status: SHIPPED | OUTPATIENT
Start: 2024-03-11 | End: 2024-06-09

## 2024-03-11 RX ORDER — ALBUTEROL SULFATE 90 UG/1
2 AEROSOL, METERED RESPIRATORY (INHALATION) EVERY 4 HOURS PRN
COMMUNITY
Start: 2024-02-08 | End: 2024-03-11

## 2024-03-11 RX ORDER — ALBUTEROL SULFATE 90 UG/1
2 AEROSOL, METERED RESPIRATORY (INHALATION) EVERY 4 HOURS PRN
Qty: 5 G | Refills: 3 | Status: SHIPPED | OUTPATIENT
Start: 2024-03-11

## 2024-03-11 RX ORDER — FLUOXETINE HYDROCHLORIDE 20 MG/1
20 CAPSULE ORAL 2 TIMES DAILY
Qty: 60 CAPSULE | Refills: 11 | Status: CANCELLED | OUTPATIENT
Start: 2024-03-11

## 2024-03-11 NOTE — PROGRESS NOTES
Chief Complaint   Patient presents with    Physical     Patient will get pap with gyn        HPI:  Patient is here for her annual physical.   Reina Castro is a 53 year old female who presents for follow-up of elevated blood pressure. She is exercising and is adherent to a low-salt diet. Blood pressure is well controlled at home. Cardiac symptoms: none. Patient denies: chest pain, dyspnea, exertional chest pressure/discomfort, orthopnea and paroxysmal nocturnal dyspnea. She is on losartan-hydrochlorothiazide.     Patient has a hx of anxiety, who presents for follow up.she is on fluoxetine 20mg bid .  Anxiety is not controlled the last couple weeks. She does see therapist. She feels tired easily. Daughter has mental health issues.        Patient has hx of RAD. She takes singulair daily and albuterol puffs PRN. Has not had any acute exacerbations. Needs refill on medications.     She is on gabapentin for left eye twitch.     Her stepdaughter   at age 21 for drug overdose.     Kadeem has been dieting well. She cut back on snacks , juices, sweets. She drinks primarily water.       She started weight watchers 2 months ago.     Smoking: none  Alcohol: occasionally   Drugs: none   Sexual hx: 1 partner   STD hx: declined  Colonoscopy: , due again in .     Wt Readings from Last 6 Encounters:   24 224 lb (101.6 kg)   10/25/23 250 lb (113.4 kg)   22 250 lb (113.4 kg)   22 237 lb (107.5 kg)   21 240 lb (108.9 kg)   21 247 lb (112 kg)         Review of Systems   Constitutional: Negative for fever, chills and fatigue. No distress.  HENT: Negative for hearing loss, congestion, sore throat, neck pain and dental problem.    Eyes: Negative for pain and visual disturbance.   Respiratory: Negative for cough, chest tightness, shortness of breath and wheezing.    Cardiovascular: Negative for chest pain, palpitations and leg swelling.   Gastrointestinal: Negative for nausea, vomiting, abdominal  pain, diarrhea, blood in stool and abdominal distention.   Genitourinary: Negative for dysuria, hematuria and difficulty urinating.   Musculoskeletal: Negative for myalgias,  joint swelling, arthralgias and gait problem. She does report back pain.   Skin: Negative for color change and rash.   Neurological: Negative for dizziness, syncope, weakness, numbness, tingling and headaches.   Hematological: Negative for adenopathy. Does not bruise/bleed easily.   Psychiatric/Behavioral: The patient is  nervous/anxious. She has depression.    Patient Active Problem List   Diagnosis    Reactive airway disease (HCC)    Rheumatoid arthritis involving multiple sites with positive rheumatoid factor (HCC)    Allergic rhinitis    CHIDI (obstructive sleep apnea)    Benign neoplasm of rectum and anal canal    Benign neoplasm of descending colon    Essential hypertension    History of adenomatous polyp of colon    Family history of colonic polyps    Asthma with acute exacerbation, unspecified asthma severity, unspecified whether persistent ([facility])       Past Medical History:   Diagnosis Date    Abnormal Pap smear of cervix     Anxiety     Arthritis 5 years    Positive for RA    Asthma (HCC)     Back pain 5-6 years ago    Bad breath Off and on.    Worse with allergies.    Belching Couple years    When i get really hungry i belch    Blood in the stool About a month    Blurred vision 1980’s    Wear glasses for decades    Body piercing 40 years sgo    Ears    Constipation     Depression     Diarrhea, unspecified Month ago    Have had soft stool for a while    Dizziness Off and on.    I get vertigo    Essential hypertension 11/21/2019    Exposure to TB Not sure    Spots on lungs.    Extrinsic asthma, unspecified     Fatigue Fewmonths    Seem to be tired allthe time    Feeling lonely Off and on    Take medication for this    Flatulence/gas pain/belching Dependent on meals    Off and on    Food intolerance Few years    Allergic to some foods.     Headache disorder Since     Since child birth    Heartburn Off snd on    Off and on depending on meals    Heavy menses     Hemorrhoids Off and on    High cholesterol     History of depression 3-5 years    Indigestion Off and on.    Dependinrg on diet    Irregular bowel habits A month or so    Leaking of urine     Sine child birth in     Nausea Mornings when i dont eat    Mornings when i dont eat breakfast    Night sweats Rare    Not always  but some time.    Pain in joints 5-6 years ago    Presence of other cardiac implants and grafts  maybe    Tooth    Shortness of breath Off and on for 4-5 years    Sleep apnea 3 years    Sputum production Allergy season    Stool incontinence     Soft consistency    Stress 3-5 years    Wears glasses ’s    Wheezing      Past Surgical History:   Procedure Laterality Date    ANKLE FRACTURE SURGERY      R ankle- cadaver bone implanted d/t fx           D & C      OTHER      D&C    OTHER SURGICAL HISTORY      tooth implant #19 titanium collin    TOE SURGERY      R toe sx toes grinding     TONSILLECTOMY       Family History   Problem Relation Age of Onset    Cancer Father     Pacemaker Father     Thyroid Disorder Father     Heart Disorder Mother     Bipolar Disorder Mother     Heart Attack Mother     Mental Disorder Mother         Bi polar    Colon Polyps Mother     Diabetes Maternal Grandmother     Diabetes Maternal Grandfather     Diabetes Paternal Grandmother     Diabetes Paternal Grandfather     Hypertension Sister     Mental Disorder Sister         Depression    Colon Polyps Sister         Had large intestine removed due to inertia issue.     Social History     Socioeconomic History    Marital status:    Tobacco Use    Smoking status: Never    Smokeless tobacco: Never   Vaping Use    Vaping Use: Never used   Substance and Sexual Activity    Alcohol use: Not Currently     Alcohol/week: 1.0 standard drink of alcohol     Types: 1 Standard  drinks or equivalent per week     Comment: Rarely    Drug use: Never   Other Topics Concern    Caffeine Concern Yes     Comment: 2-3 sodas a day    Exercise No     Social Determinants of Health     Financial Resource Strain: Low Risk  (10/27/2023)    Financial Resource Strain     Difficulty of Paying Living Expenses: Not very hard     Med Affordability: No   Food Insecurity: No Food Insecurity (10/25/2023)    Food Insecurity     Food Insecurity: Never true   Transportation Needs: No Transportation Needs (10/27/2023)    Transportation Needs     Lack of Transportation: No   Housing Stability: Low Risk  (10/25/2023)    Housing Stability     Housing Instability: No       Current Outpatient Medications   Medication Sig Dispense Refill    albuterol 108 (90 Base) MCG/ACT Inhalation Aero Soln Inhale 2 puffs into the lungs every 4 (four) hours as needed. 5 g 3    fluticasone furoate-vilanterol 100-25 MCG/ACT Inhalation Aerosol Powder, Breath Activated Inhale 1 puff into the lungs daily. 60 each 3    gabapentin 300 MG Oral Cap Take 1 capsule (300 mg total) by mouth nightly. 90 capsule 3    losartan-hydroCHLOROthiazide 50-12.5 MG Oral Tab Take 1 tablet by mouth daily. 90 tablet 3    montelukast 10 MG Oral Tab Take 1 tablet (10 mg total) by mouth nightly. 90 tablet 3    FLUoxetine HCl 40 MG Oral Cap Take 1 capsule (40 mg total) by mouth daily. 90 capsule 0    FLUoxetine 20 MG Oral Cap Take 1 capsule (20 mg total) by mouth at bedtime. 90 capsule 0    Fluticasone Propionate (FLONASE) 50 MCG/ACT Nasal Suspension 2 sprays in each nostril daily as needed 1 Bottle 0       Allergies  Allergies   Allergen Reactions    Adhesive Tape HIVES    Clindamycin HIVES    Codeine HIVES    Iodine (Topical) HIVES    Levofloxacin HIVES    Other SWELLING    Red Dye SWELLING and HIVES     #40    Shellfish Allergy SWELLING    Tomato HIVES    Crabs (Crustaceans) SWELLING     blistering    Lobster SWELLING     blistering    Penicillinase RASH     Penicillins HIVES    Potato SWELLING     White potatoes    Tomatoes SWELLING       Health Maintenance  Immunizations:  Immunization History   Administered Date(s) Administered    Covid-19 Vaccine Moderna 100 mcg/0.5 ml 03/17/2021, 04/14/2021    FLULAVAL 6 months & older 0.5 ml Prefilled syringe (71909) 11/05/2019, 09/21/2020    FLUZONE 6 months and older PFS 0.5 ml (68219) 09/21/2018, 11/05/2019, 09/21/2020    Fluvirin, 3 Years & >, Im 09/24/2014    Influenza 09/14/2016, 10/08/2017, 01/16/2024    Influenza(Afluria)0.5ml QIV PFS 09/22/2018    Moderna Covid-19 Vaccine 50mcg/0.5ml 12yrs+ (1364-4645) 01/16/2024    Pneumococcal Conjugate PCV20 03/11/2024    TDAP 05/28/2013, 02/17/2015    Varicella 06/11/2013    Zoster Vaccine Recombinant Adjuvanted (Shingrix) 11/19/2019   Deferred Date(s) Deferred    FLUZONE 6 months and older PFS 0.5 ml (34051) 10/26/2023         Physical Exam  /71   Pulse 67   Temp 98 °F (36.7 °C) (Temporal)   Resp 16   Ht 5' 5\" (1.651 m)   Wt 224 lb (101.6 kg)   LMP 06/11/2016   SpO2 97%   BMI 37.28 kg/m²   Constitutional: Oriented to person, place, and time. No distress.   HEENT:  Normocephalic and atraumatic. Hearing and tympanic membranes normal.  Nose normal. Oropharynx is clear and moist.   Eyes: Conjunctivae and EOM are normal. PERRLA. No scleral icterus.   Neck: Normal range of motion. Neck supple. No mass and no thyromegaly present.   Cardiovascular: Normal rate, regular rhythm and intact distal pulses.  No murmur, rubs or gallops.   Pulmonary/Chest: Effort normal and breath sounds normal. No respiratory distress. No wheezes, rhonchi or rales  Abdominal: Soft. Bowel sounds are normal. Non tender, no masses, no organomegaly or hernias.  Neurological: grossly normal   Psychiatric: depressed, no SI or HI     A/P:    Encounter Diagnoses   Name Primary?    Essential hypertension     Mild intermittent reactive airway disease without complication (HCC)     CHIDI (obstructive sleep apnea)      History of adenomatous polyp of colon     Well adult exam Yes    Breast cancer screening by mammogram     Encounter for Prevnar pneumococcal vaccination     Need for vaccination     Recurrent major depressive disorder, in full remission (HCC)       1. Essential hypertension  BP shows good control with last BP of 100/71. Continue lifestyle changes, diet, exercise and weight loss.   Last K was 3.6 done on 10/26/2023.  Last Cr was 1.04 done on 10/26/2023.  Last eGFR was 63 on 10/26/2023.  BP Meds: losartan-hydroCHLOROthiazide Tabs - 50-12.5 MG     - losartan-hydroCHLOROthiazide 50-12.5 MG Oral Tab; Take 1 tablet by mouth daily.  Dispense: 90 tablet; Refill: 3  - Hemoglobin A1C [E]    2. Mild intermittent reactive airway disease without complication (HCC)  - montelukast 10 MG Oral Tab; Take 1 tablet (10 mg total) by mouth nightly.  Dispense: 90 tablet; Refill: 3    3. CHIDI (obstructive sleep apnea)  Controlled.     4. History of adenomatous polyp of colon  Utd on screening     5. Well adult exam  - -Discussed diet and exercise, counseled on vaccine and screening guidelines.     6. Breast cancer screening by mammogram  - [facility] ROLF 2D+3D SCREENING BILAT (CPT=77067/29407); Future    7. Encounter for Prevnar pneumococcal vaccination  - Immunization Admin Counseling, 1st Component, 18 years and older  - PCV20 (Prevnar 20)    8. Need for vaccination  - PCV20 (Prevnar 20)    9. Recurrent major depressive disorder, in full remission (HCC)  Increased fluoxetine to 60mg daily. F/u 6-8 weeks or sooner.   Continue with therapy.   - FLUoxetine HCl 40 MG Oral Cap; Take 1 capsule (40 mg total) by mouth daily.  Dispense: 90 capsule; Refill: 0  - FLUoxetine 20 MG Oral Cap; Take 1 capsule (20 mg total) by mouth at bedtime.  Dispense: 90 capsule; Refill: 0      Orders Placed This Encounter   Procedures    Hemoglobin A1C [E]    PCV20 (Prevnar 20)    Immunization Admin Counseling, 1st Component, 18 years and older       Meds & Refills for  this Visit:  Requested Prescriptions     Signed Prescriptions Disp Refills    albuterol 108 (90 Base) MCG/ACT Inhalation Aero Soln 5 g 3     Sig: Inhale 2 puffs into the lungs every 4 (four) hours as needed.    fluticasone furoate-vilanterol 100-25 MCG/ACT Inhalation Aerosol Powder, Breath Activated 60 each 3     Sig: Inhale 1 puff into the lungs daily.    gabapentin 300 MG Oral Cap 90 capsule 3     Sig: Take 1 capsule (300 mg total) by mouth nightly.    losartan-hydroCHLOROthiazide 50-12.5 MG Oral Tab 90 tablet 3     Sig: Take 1 tablet by mouth daily.    montelukast 10 MG Oral Tab 90 tablet 3     Sig: Take 1 tablet (10 mg total) by mouth nightly.    FLUoxetine HCl 40 MG Oral Cap 90 capsule 0     Sig: Take 1 capsule (40 mg total) by mouth daily.    FLUoxetine 20 MG Oral Cap 90 capsule 0     Sig: Take 1 capsule (20 mg total) by mouth at bedtime.       Imaging & Consults:  PCV20 VACCINE FOR INTRAMUSCULAR USE  [facility] ROLF 2D+3D SCREENING BILAT (CPT=77067/18010)      No follow-ups on file.    There are no Patient Instructions on file for this visit.    All questions were answered and the patient understands the plan.

## 2024-03-12 ENCOUNTER — TELEPHONE (OUTPATIENT)
Dept: FAMILY MEDICINE CLINIC | Facility: CLINIC | Age: 54
End: 2024-03-12

## 2024-03-15 ENCOUNTER — TELEPHONE (OUTPATIENT)
Dept: FAMILY MEDICINE CLINIC | Facility: CLINIC | Age: 54
End: 2024-03-15

## 2024-03-15 NOTE — TELEPHONE ENCOUNTER
Preferred medications    Advair HFA  Arnity Ellipta  Breztri Aerosphere  Budexonide-Formoteral Fumarate  Dulera     To pcp

## 2024-03-18 RX ORDER — FLUTICASONE PROPIONATE AND SALMETEROL 100; 50 UG/1; UG/1
1 POWDER RESPIRATORY (INHALATION) 2 TIMES DAILY
Qty: 60 EACH | Refills: 0 | Status: SHIPPED | OUTPATIENT
Start: 2024-03-18

## 2024-03-18 NOTE — TELEPHONE ENCOUNTER
Estela Haji, DO  [facility] Clinical Staff11 hours ago (12:04 AM)       If this can't get improved then go ahead and send Advair 100/50 1 puff BID.

## 2024-03-22 LAB
ABSOLUTE BASOPHILS: 83 CELLS/UL (ref 0–200)
ABSOLUTE EOSINOPHILS: 307 CELLS/UL (ref 15–500)
ABSOLUTE LYMPHOCYTES: 2540 CELLS/UL (ref 850–3900)
ABSOLUTE MONOCYTES: 440 CELLS/UL (ref 200–950)
ABSOLUTE NEUTROPHILS: 4930 CELLS/UL (ref 1500–7800)
ALBUMIN/GLOBULIN RATIO: 1.4 (CALC) (ref 1–2.5)
ALBUMIN: 4 G/DL (ref 3.6–5.1)
ALKALINE PHOSPHATASE: 69 U/L (ref 37–153)
ALT: 15 U/L (ref 6–29)
AST: 14 U/L (ref 10–35)
BASOPHILS: 1 %
BILIRUBIN, TOTAL: 0.4 MG/DL (ref 0.2–1.2)
BUN: 14 MG/DL (ref 7–25)
CALCIUM: 9.2 MG/DL (ref 8.6–10.4)
CARBON DIOXIDE: 27 MMOL/L (ref 20–32)
CHLORIDE: 107 MMOL/L (ref 98–110)
CHOL/HDLC RATIO: 4.3 (CALC)
CHOLESTEROL, TOTAL: 165 MG/DL
CREATININE: 0.63 MG/DL (ref 0.5–1.03)
EGFR: 105 ML/MIN/1.73M2
EOSINOPHILS: 3.7 %
GLOBULIN: 2.9 G/DL (CALC) (ref 1.9–3.7)
GLUCOSE: 95 MG/DL (ref 65–99)
HDL CHOLESTEROL: 38 MG/DL
HEMATOCRIT: 40.1 % (ref 35–45)
HEMOGLOBIN A1C: 5.7 % OF TOTAL HGB
HEMOGLOBIN: 12.5 G/DL (ref 11.7–15.5)
LDL-CHOLESTEROL: 103 MG/DL (CALC)
LYMPHOCYTES: 30.6 %
MCH: 25.2 PG (ref 27–33)
MCHC: 31.2 G/DL (ref 32–36)
MCV: 80.7 FL (ref 80–100)
MONOCYTES: 5.3 %
MPV: 11.8 FL (ref 7.5–12.5)
NEUTROPHILS: 59.4 %
NON-HDL CHOLESTEROL: 127 MG/DL (CALC)
PLATELET COUNT: 229 THOUSAND/UL (ref 140–400)
POTASSIUM: 3.8 MMOL/L (ref 3.5–5.3)
PROTEIN, TOTAL: 6.9 G/DL (ref 6.1–8.1)
RDW: 14.5 % (ref 11–15)
RED BLOOD CELL COUNT: 4.97 MILLION/UL (ref 3.8–5.1)
SODIUM: 143 MMOL/L (ref 135–146)
TRIGLYCERIDES: 137 MG/DL
WHITE BLOOD CELL COUNT: 8.3 THOUSAND/UL (ref 3.8–10.8)

## 2024-06-08 DIAGNOSIS — F33.42 RECURRENT MAJOR DEPRESSIVE DISORDER, IN FULL REMISSION (HCC): ICD-10-CM

## 2024-06-10 RX ORDER — FLUOXETINE HYDROCHLORIDE 20 MG/1
20 CAPSULE ORAL NIGHTLY
Qty: 90 CAPSULE | Refills: 0 | Status: SHIPPED | OUTPATIENT
Start: 2024-06-10

## 2024-06-10 NOTE — TELEPHONE ENCOUNTER
Requested Prescriptions     Pending Prescriptions Disp Refills    FLUOXETINE 20 MG Oral Cap [Pharmacy Med Name: FLUOXETINE HCL 20 MG CAPSULE] 90 capsule 0     Sig: TAKE 1 CAPSULE BY MOUTH AT BEDTIME.       LOV: 3/11/24  RTC:   Last Relevant Labs: 3/21/24  Filled: 3/11/24 # 90 with 0 refills    Future Appointments   Date Time Provider Department Center   9/10/2024  3:00 PM Estela Haji DO [facility] [address]

## 2024-07-08 ENCOUNTER — TELEPHONE (OUTPATIENT)
Dept: FAMILY MEDICINE CLINIC | Facility: CLINIC | Age: 54
End: 2024-07-08

## 2024-07-08 NOTE — TELEPHONE ENCOUNTER
Prior Authorization received from [employer] for refill  Key:[id number]    Placed fax in MA folder.    fluticasone furoate-vilanterol 100-25 MCG/ACT Inhalation Aerosol Powder, Breath Activated (Discontinued)60 each33/11/20243/18/2024Sig - Route: Inhale 1 puff into the lungs daily. - InhalationSent to pharmacy as: Fluticasone Furoate-Vilanterol 100-25 MCG/ACT Inhalation Aerosol Powder Breath Activated (Breo Ellipta)E-Prescribing Status: Receipt confirmed by pharmacy (3/11/2024  4:35 PM CDT)Prior authorization: Canceled - Other (Went thru covermymeds)

## 2024-07-25 DIAGNOSIS — F33.42 RECURRENT MAJOR DEPRESSIVE DISORDER, IN FULL REMISSION (HCC): ICD-10-CM

## 2024-07-25 RX ORDER — FLUOXETINE HYDROCHLORIDE 20 MG/1
20 CAPSULE ORAL NIGHTLY
Qty: 90 CAPSULE | Refills: 0 | Status: SHIPPED | OUTPATIENT
Start: 2024-07-25

## 2024-07-25 NOTE — TELEPHONE ENCOUNTER
Advair HFA Not Required  Arnuity Ellipta Not Required  Breztri Aerosphere Not Required  Budesonide-Formoterol Fumarate Dihydrate Not Required  Dulera Not Required      Per previous TE, medication changed to Advair

## 2024-07-25 NOTE — TELEPHONE ENCOUNTER
Calling to see if a new prescription for the Fluticasone Salmeterol diskus 100-50 can be sent to [facility] Was previously sent to local pharmacy.

## 2024-07-25 NOTE — TELEPHONE ENCOUNTER
Requested Prescriptions     Pending Prescriptions Disp Refills    FLUoxetine 20 MG Oral Cap 90 capsule 0     Sig: Take 1 capsule (20 mg total) by mouth nightly. TAKE AT BEDTIME       LOV: 3/11/24  RTC:   Filled: 6/10/24 #90 with 0 refills    Future Appointments   Date Time Provider Department Center   9/10/2024  3:00 PM Estela Haji DO [facility] [address]

## 2024-07-26 NOTE — TELEPHONE ENCOUNTER
[facility] calling to follow up on previous refill request for fluticasone-salmeterol 100-50 MCG/ACT.     Please advise.     Ref # [id number]  Ph: [phone]

## 2024-07-29 ENCOUNTER — TELEPHONE (OUTPATIENT)
Dept: FAMILY MEDICINE CLINIC | Facility: CLINIC | Age: 54
End: 2024-07-29

## 2024-07-29 DIAGNOSIS — J45.40 MODERATE PERSISTENT REACTIVE AIRWAY DISEASE WITHOUT COMPLICATION (HCC): Primary | ICD-10-CM

## 2024-07-29 DIAGNOSIS — J45.901 ASTHMA WITH ACUTE EXACERBATION, UNSPECIFIED ASTHMA SEVERITY, UNSPECIFIED WHETHER PERSISTENT (HCC): ICD-10-CM

## 2024-07-29 NOTE — TELEPHONE ENCOUNTER
Rec'd incoming fax request for Fluticasone Furoate- Vilanterol 100-25MCG/ACT    Key: [id number]    Fax placed in MA folder.

## 2024-07-30 RX ORDER — FLUTICASONE PROPIONATE AND SALMETEROL 100; 50 UG/1; UG/1
1 POWDER RESPIRATORY (INHALATION) 2 TIMES DAILY
Qty: 60 EACH | Refills: 0 | Status: SHIPPED | OUTPATIENT
Start: 2024-07-30 | End: 2024-07-31

## 2024-07-31 RX ORDER — BUDESONIDE AND FORMOTEROL FUMARATE DIHYDRATE 80; 4.5 UG/1; UG/1
2 AEROSOL RESPIRATORY (INHALATION) 2 TIMES DAILY
Qty: 10.2 G | Refills: 2 | Status: SHIPPED | OUTPATIENT
Start: 2024-07-31 | End: 2024-10-29

## 2024-08-06 DIAGNOSIS — I10 ESSENTIAL HYPERTENSION: ICD-10-CM

## 2024-08-06 DIAGNOSIS — F33.42 RECURRENT MAJOR DEPRESSIVE DISORDER, IN FULL REMISSION (HCC): ICD-10-CM

## 2024-08-06 RX ORDER — LOSARTAN POTASSIUM AND HYDROCHLOROTHIAZIDE 12.5; 5 MG/1; MG/1
1 TABLET ORAL DAILY
Qty: 90 TABLET | Refills: 3 | OUTPATIENT
Start: 2024-08-06

## 2024-08-06 RX ORDER — FLUOXETINE HYDROCHLORIDE 20 MG/1
20 CAPSULE ORAL NIGHTLY
Qty: 90 CAPSULE | Refills: 0 | OUTPATIENT
Start: 2024-08-06

## 2024-08-06 NOTE — TELEPHONE ENCOUNTER
Requested Prescriptions     Pending Prescriptions Disp Refills    losartan-hydroCHLOROthiazide 50-12.5 MG Oral Tab 90 tablet 3     Sig: Take 1 tablet by mouth daily.    FLUoxetine 20 MG Oral Cap 90 capsule 0     Sig: Take 1 capsule (20 mg total) by mouth nightly. TAKE AT BEDTIME     LOV: 3/11/24  RTC: 3 months   Last Relevant Labs: 3/21/24  Filled: 3/11/24 #90 with 3 refills  Filled: 7/25/24 #90 with 0 refills    Future Appointments   Date Time Provider Department Center   8/12/2024  3:20 PM PF [facility] [place]   8/16/2024  8:30 AM PF CT RM1 PF CT [place]   9/10/2024  3:00 PM Estela Haji,  [facility] [address]

## 2024-08-12 ENCOUNTER — HOSPITAL ENCOUNTER (OUTPATIENT)
Dept: MAMMOGRAPHY | Age: 54
Discharge: HOME | End: 2024-08-12
Attending: FAMILY MEDICINE
Payer: COMMERCIAL

## 2024-08-12 DIAGNOSIS — Z12.31 BREAST CANCER SCREENING BY MAMMOGRAM: ICD-10-CM

## 2024-08-12 PROCEDURE — 77063 BREAST TOMOSYNTHESIS BI: CPT | Performed by: FAMILY MEDICINE

## 2024-08-12 PROCEDURE — 77067 SCR MAMMO BI INCL CAD: CPT | Performed by: FAMILY MEDICINE

## 2024-08-16 ENCOUNTER — HOSPITAL ENCOUNTER (OUTPATIENT)
Dept: CT IMAGING | Age: 54
Discharge: HOME | End: 2024-08-16
Attending: INTERNAL MEDICINE
Payer: COMMERCIAL

## 2024-08-16 DIAGNOSIS — R93.89 ABNORMAL CT OF THE CHEST: ICD-10-CM

## 2024-08-16 PROCEDURE — 71250 CT THORAX DX C-: CPT | Performed by: INTERNAL MEDICINE

## 2024-08-21 DIAGNOSIS — R91.1 PULMONARY NODULE: Primary | ICD-10-CM

## 2024-09-09 NOTE — LETTER
ASTHMA ACTION PLAN for Cheo Ontiveros     : 1970     Date: 2022  Provider:  Veronica Black DO  Phone for doctor or clinic: [facility], [address] [facility] , [address] [place] [phone]  [phone]    ACT Score: 20      You can use the colors of a traffic light to help learn about your asthma medicines. 1. Green - Go! % of Personal Best Peak Flow Use controller medicine. Breathing is good  No cough or wheeze  Can work and play Medicine How much to take When to take it    Singulair (Montelukast) 10 mg by mouth daily  Albuterol inhaler,  2 puffs every four hours as needed. 2. Yellow - Caution. 50-79% Personal Best Peak  Flow. Use reliever medicine to keep an asthma attack from getting bad. Cough  Wheezing  Tight Chest  Wake up at night Medicine How much to take When to take it    Albuterol inhaler,  2 puffs every four hours as needed. Additional instructions         3. Red - Stop! Danger!  <50% Personal Best Peak  Flow. Take these medications until  Get help from a doctor   Medicine not helping  Breathing is hard and fast  Nose opens wide  Can't walk  Ribs show  Can't talk well Medicine How much to take When to take it    CALL 911, GO TO NEAREST ER, CALL YOUR DOCTOR     Additional Instructions If your symptoms do not improve and you cannot contact your doctor, [facility] or call 911 immediately! [x] Asthma Action Plan reviewed with patient (and caregiver if necessary) and a copy of the plan was given to the patient/caregiver. [] Asthma Action Plan reviewed with patient (and caregiver if necessary) on the phone and mailed copy to patient or submitted via [address]      Signatures:  Provider  Veronica Black DO   Patient Caretaker PA has been started for Enbrel 50mg/ml

## 2024-09-10 ENCOUNTER — OFFICE VISIT (OUTPATIENT)
Dept: FAMILY MEDICINE CLINIC | Facility: CLINIC | Age: 54
End: 2024-09-10
Payer: COMMERCIAL

## 2024-09-10 VITALS
BODY MASS INDEX: 38.15 KG/M2 | WEIGHT: 229 LBS | SYSTOLIC BLOOD PRESSURE: 124 MMHG | DIASTOLIC BLOOD PRESSURE: 78 MMHG | HEART RATE: 75 BPM | TEMPERATURE: 98 F | RESPIRATION RATE: 16 BRPM | HEIGHT: 65 IN | OXYGEN SATURATION: 99 %

## 2024-09-10 DIAGNOSIS — F33.42 RECURRENT MAJOR DEPRESSIVE DISORDER, IN FULL REMISSION (HCC): ICD-10-CM

## 2024-09-10 DIAGNOSIS — I25.83 CORONARY ATHEROSCLEROSIS DUE TO LIPID RICH PLAQUE: ICD-10-CM

## 2024-09-10 DIAGNOSIS — I25.10 CORONARY ATHEROSCLEROSIS DUE TO LIPID RICH PLAQUE: ICD-10-CM

## 2024-09-10 DIAGNOSIS — R73.03 PREDIABETES: ICD-10-CM

## 2024-09-10 DIAGNOSIS — I10 ESSENTIAL HYPERTENSION: ICD-10-CM

## 2024-09-10 DIAGNOSIS — J45.20 MILD INTERMITTENT REACTIVE AIRWAY DISEASE WITHOUT COMPLICATION (HCC): ICD-10-CM

## 2024-09-10 DIAGNOSIS — Z12.4 ROUTINE CERVICAL SMEAR: ICD-10-CM

## 2024-09-10 DIAGNOSIS — Z01.419 WELL WOMAN EXAM WITH ROUTINE GYNECOLOGICAL EXAM: Primary | ICD-10-CM

## 2024-09-10 DIAGNOSIS — Z12.4 SCREENING FOR CERVICAL CANCER: ICD-10-CM

## 2024-09-10 PROCEDURE — 99396 PREV VISIT EST AGE 40-64: CPT | Performed by: FAMILY MEDICINE

## 2024-09-10 PROCEDURE — 87624 HPV HI-RISK TYP POOLED RSLT: CPT | Performed by: FAMILY MEDICINE

## 2024-09-10 PROCEDURE — 88175 CYTOPATH C/V AUTO FLUID REDO: CPT | Performed by: FAMILY MEDICINE

## 2024-09-10 PROCEDURE — 99213 OFFICE O/P EST LOW 20 MIN: CPT | Performed by: FAMILY MEDICINE

## 2024-09-10 RX ORDER — LOSARTAN POTASSIUM AND HYDROCHLOROTHIAZIDE 12.5; 5 MG/1; MG/1
1 TABLET ORAL DAILY
Qty: 90 TABLET | Refills: 3 | Status: SHIPPED | OUTPATIENT
Start: 2024-09-10

## 2024-09-10 RX ORDER — ATORVASTATIN CALCIUM 10 MG/1
10 TABLET, FILM COATED ORAL DAILY
Qty: 90 TABLET | Refills: 3 | Status: SHIPPED | OUTPATIENT
Start: 2024-09-10

## 2024-09-10 RX ORDER — MONTELUKAST SODIUM 10 MG/1
10 TABLET ORAL NIGHTLY
Qty: 90 TABLET | Refills: 3 | Status: SHIPPED | OUTPATIENT
Start: 2024-09-10

## 2024-09-10 NOTE — PROGRESS NOTES
HPI:   Reina Castro is a 54 year old female that presents for well woman exam.        Reina Castro is a 54 year old female who presents for follow-up of elevated blood pressure. She is exercising and is adherent to a low-salt diet. Blood pressure is well controlled at home. Cardiac symptoms: none. Patient denies: chest pain, dyspnea, exertional chest pressure/discomfort, orthopnea and paroxysmal nocturnal dyspnea. She is on losartan-hydrochlorothiazide.     Last A1c value was 5.7% done 3/21/2024.   Pt saw ophtho- had some hemorrhages- being monitored.     Wt Readings from Last 6 Encounters:   09/10/24 229 lb (103.9 kg)   03/11/24 224 lb (101.6 kg)   10/25/23 250 lb (113.4 kg)   11/04/22 250 lb (113.4 kg)   07/05/22 237 lb (107.5 kg)   11/01/21 240 lb (108.9 kg)     Reviewed ct chest order by pulm- due for follow up to review the dumont results.     There is finding of atherosclerosis of coronary arteries.   Heart scan ordered.   Lipid panel reviewed  The 10-year ASCVD risk score (Breanna HUFF, et al., 2019) is: 3.2%    Values used to calculate the score:      Age: 54 years      Sex: Female      Is Non- : No      Diabetic: No      Tobacco smoker: No      Systolic Blood Pressure: 124 mmHg      Is BP treated: Yes      HDL Cholesterol: 38 mg/dL      Total Cholesterol: 165 mg/dL    Discussed statin use and ASCVD risk score with patient.   CT CHEST (CPT=71250)    Result Date: 8/16/2024  CONCLUSION:  Right middle lobe pulmonary nodule measuring 7 millimeters is stable.  This may related to granulomatous disease although continued six-month follow-up is recommended.  The findings include a single, incidentally detected, solid pulmonary nodule, measuring between 6 and 8mm.  2017 guidelines from the Fleischner Society for the follow-up and management of newly detected indeterminate pulmonary nodules in persons at least  35 years of age depend on nodule size (average length and width) and underlying  risk factors (including smoking and other risk factors). Please consider the following recommendations after clinical assessment of risk factors.  For 6-8mm nodules: In low risk patients, CT in 6 to 12 months, then consider CT in 18 to 24 months.  In high risk patients, CT in 6 to 12 months, then obtain CT in 18 to 24 months.   LOCATION:  [place]   Dictated by (CST): Mathew Figueroa MD on 8/16/2024 at 11:36 AM     Finalized by (CST): Mathew Figueroa MD on 8/16/2024 at 11:56 AM       [facility] ROLF 2D+3D SCREENING BILAT (CPT=77067/67627)    Result Date: 8/13/2024  CONCLUSION:  No suspicious change from prior mammography.  No mammographic evidence of malignancy.  BI-RADS CATEGORY:  DIAGNOSTIC CATEGORY 2--BENIGN FINDING:   RECOMMENDATIONS:  ROUTINE MAMMOGRAM AND CLINICAL EVALUATION IN 12 MONTHS.       A letter explaining the results in lay terms has been sent to the patient.  This exam was evaluated with a computer-aided device.  This patient's information has been entered into a reminder system with a target due date for the next mammogram.   LOCATION:  [facility]   Dictated by (CST): Annamaria Almanzar MD on 8/13/2024 at 10:59 AM     Finalized by (CST): Annamaria Almanzar MD on 8/13/2024 at 11:05 AM        Patient has a hx of anxiety, who presents for follow up.she is on fluoxetine 20mg bid . Following therapist. She had missed some pills because she lost her pill bottle. Now back on track with taking her meds.     Patient has hx of RAD. She takes singulair daily and albuterol puffs PRN. Has not had any acute exacerbations. Needs refill on medications.     She is on gabapentin for left eye twitch.      Eating healthier, losing weight.      Smoking: none  Alcohol: occasionally   Drugs: none   Colonoscopy: 2022, due again in 2027.   hx of abnormal pap : Yes- in her 20s.   STI testing desired: No  Menstrual Issues: No; menopausal.   Mammogram: 8/12/24, [facility] HX of breast cancer: No  Colonscopy: 2022 due again in 2027, Fam Hx of Colon  Cancer: No      REVIEW OF SYSTEMS:     Comprehensive ROS negative unless noted in HPI    PHYSICAL EXAM:   /78   Pulse 75   Temp 97.7 °F (36.5 °C) (Temporal)   Resp 16   Ht 5' 5\" (1.651 m)   Wt 229 lb (103.9 kg)   LMP 06/11/2016   SpO2 99%   BMI 38.11 kg/m²  Estimated body mass index is 38.11 kg/m² as calculated from the following:    Height as of this encounter: 5' 5\" (1.651 m).    Weight as of this encounter: 229 lb (103.9 kg).   Vital signs reviewed.Appears stated age, well groomed.  Physical Exam:  GEN:  Patient is alert, awake and oriented, well developed, well nourished, no apparent distress.  HEENT:     Head:  Normocephalic, atraumatic    Eyes: EOMI, PERRLA, no scleral icterus, conjunctivae clear bilaterally   Ears: External normal. TMs normal without erythema or effusion   Nose: patent, no nasal discharge    Throat:  No tonsillar erythema or exudate.     Mouth:  No oral lesions or ulcerations, good dentition.  NECK: Supple, no thyromegaly.  SKIN: No rashes, no skin lesion, no bruising, good turgor.  HEART:  Regular rate and rhythm, no murmurs, rubs or gallops.  LUNGS: Clear to auscultation bilterally, no rales/rhonchi/wheezing.  BREAST: No palpable masses, breasts are nontender, no nipple discharge, no skin changes, axillary lymph nodes non palpable  ABDOMEN:  Soft, nondistended, nontender, bowel sounds normal in all 4 quadrants, no masses, no hepatosplenomegaly.   : vagina normal without lesions or discharge, cervix normal appearing with stenotic cervical internal os, no CMT, normal firm uterus tilted to right, nontender adnexa, no palpable mass.    EXTREMITIES:  No edema,   NEURO:  Grossly normal, gait stable   Declined chaperone.     ASSESSMENT AND PLAN:      1. Routine cervical smear  - ThinPrep PAP Smear B; Future  - ThinPrep PAP Smear B  - Image-Guided Pap Smear ([facility])    2. Screening for cervical cancer  - Hpv High Risk , Thin Prep Collect; Future  - Hpv High Risk , Thin Prep  Collect    3. Recurrent major depressive disorder, in full remission (HCC)  Anxiety - at goal, no suicidal or homicidal thoughts. Continue present management. Patient will call if any symptoms worsen. Patient understands risks that anti-depressants as well as anti-anxiety agents have been shown to paradoxically worsen anxiety and depression and trigger suicidal thoughts. If any of these thoughts are present patient will stop the medication(s) immediately and call the office. Patient understands and agrees to the above plan. Medication: Continue with the same medication(s).  - FLUoxetine 20 MG Oral Cap; Take 1 capsule (20 mg total) by mouth 2 (two) times daily.  Dispense: 360 capsule; Refill: 0    4. Essential hypertension  BP shows good control with last BP of 124/78. Continue lifestyle changes, diet, exercise and weight loss.   3/21/2024: POTASSIUM 3.8; CREATININE 0.63; EGFR 105  BP Meds: losartan-hydroCHLOROthiazide Tabs - 50-12.5 MG       - losartan-hydroCHLOROthiazide 50-12.5 MG Oral Tab; Take 1 tablet by mouth daily.  Dispense: 90 tablet; Refill: 3    5. Mild intermittent reactive airway disease without complication (HCC)  Continue on daily singular and albuterol as needed.   - montelukast 10 MG Oral Tab; Take 1 tablet (10 mg total) by mouth nightly.  Dispense: 90 tablet; Refill: 3    6. Coronary atherosclerosis due to lipid rich plaque  Heart scan ordered.   Reviewed ascvd risk score.   Reviewed dfindings of atherosclerosis noted on ct chest.   Discussed statin risks/benefits to reduce cvd risk   Pt agrees to start statin  pt started on statin, will notify me of any side effects, aware of potential muscle aches/cramps, liver toxicity. Will recheck cmp  and lipids in 3 months.     - CT CALCIUM SCORING; Future  - atorvastatin 10 MG Oral Tab; Take 1 tablet (10 mg total) by mouth daily.  Dispense: 90 tablet; Refill: 3  - Comp Metabolic Panel (14) [E]; Future  - Lipid Panel [E]; Future  - Comp Metabolic Panel (14)  [E]  - Lipid Panel [E]    7. Prediabetes  Last A1c value was 5.7% done 3/21/2024.  Repeat A1c ordered.   - Hemoglobin A1C [E]    8. Well woman exam with routine gynecological exam  - -Discussed diet and exercise, counseled on vaccine and screening guidelines.     Risks, benefits, and alternatives of current treatment plan discussed in detail. Red flags discussed to RTC or ED . Questions and concerns addressed. Patient (or parent) agrees to plan.         Estela Haji,         This note was prepared using Dragon Medical voice recognition dictation software. As a result errors may occur. When identified these errors have been corrected. While every attempt is made to correct errors during dictation discrepancies may still exist.      Note to patient: The 21st Century Cures Act makes medical notes like these available to patients in the interest of transparency. However, be advised this is a medical document. It is intended as peer to peer communication. It is written in medical language and may contain abbreviations or verbiage that are unfamiliar. It may appear blunt or direct. Medical documents are intended to carry relevant information, facts as evident, and the clinical opinion of the practitioner.

## 2024-09-11 PROBLEM — I25.10 CORONARY ATHEROSCLEROSIS DUE TO LIPID RICH PLAQUE: Status: ACTIVE | Noted: 2024-09-11

## 2024-09-11 PROBLEM — F33.42 RECURRENT MAJOR DEPRESSIVE DISORDER, IN FULL REMISSION (HCC): Status: ACTIVE | Noted: 2024-09-11

## 2024-09-11 PROBLEM — I25.83 CORONARY ATHEROSCLEROSIS DUE TO LIPID RICH PLAQUE: Status: ACTIVE | Noted: 2024-09-11

## 2024-09-11 PROBLEM — F33.42 RECURRENT MAJOR DEPRESSIVE DISORDER, IN FULL REMISSION: Status: ACTIVE | Noted: 2024-09-11

## 2024-09-11 PROBLEM — R73.03 PREDIABETES: Status: ACTIVE | Noted: 2024-09-11

## 2024-09-11 LAB — HPV E6+E7 MRNA CVX QL NAA+PROBE: NEGATIVE

## 2024-09-16 LAB
.: NORMAL
.: NORMAL

## 2024-09-17 ENCOUNTER — PATIENT MESSAGE (OUTPATIENT)
Dept: FAMILY MEDICINE CLINIC | Facility: CLINIC | Age: 54
End: 2024-09-17

## 2024-09-17 NOTE — TELEPHONE ENCOUNTER
From: Reina Spauldings  To: Estela Haji  Sent: 9/17/2024 1:53 PM CDT  Subject: . Endocervical and/or squamous metaplastic cells    Is there any steps that need to be taken with these cells being present? I read if they aren’t treated they could change to a different cell type that could lead to cancer.     Thanks  Reina

## 2025-03-25 DIAGNOSIS — F33.42 RECURRENT MAJOR DEPRESSIVE DISORDER, IN FULL REMISSION: ICD-10-CM

## 2025-03-26 NOTE — TELEPHONE ENCOUNTER
Requesting Fluoxetine 20mg  Last OV: 9/10/24 Physical  RTC: not noted  Last Rx'd 9/10/24 #360 with 0 refills    No future appointments.    Non-protocol med:  Rx pended and routed for approval/denial

## 2025-04-07 ENCOUNTER — TELEPHONE (OUTPATIENT)
Dept: FAMILY MEDICINE CLINIC | Facility: CLINIC | Age: 55
End: 2025-04-07

## 2025-04-09 NOTE — TELEPHONE ENCOUNTER
Future Appointments   Date Time Provider Department Center   4/15/2025 10:00 AM Estela Haji,  [facility] [address] [facility] [address]

## 2025-04-09 NOTE — TELEPHONE ENCOUNTER
Patient was in the ED on 3/23/2025 for contusion of face and concussion without LOC.     Had recently been seen by ophthalmology and ENT.     Patient requesting to follow up with PCP.     Next available appointment is 5/22/2025.    Okay to wait? Or double book sooner?

## 2025-04-09 NOTE — TELEPHONE ENCOUNTER
Please call patient to schedule a double book appointment on Tuesday, 4/15 at 1000.    Okay by PCP.

## 2025-04-14 NOTE — PROGRESS NOTES
Subjective:     History/Other:    Chief Complaint Reviewed and Verified  Nursing Notes Reviewed and   Verified  Tobacco Reviewed  Allergies Reviewed  Medications Reviewed    Problem List Reviewed  Medical History Reviewed  Surgical History   Reviewed  OB Status Reviewed  Family History Reviewed       54-year-old female with a past medical history of rheumatoid arthritis, CHIDI on CPAP, major depressive disorder, hypertension, coronary atherosclerosis, asthma presenting today for ER follow-up from 3/23/2025.  Patient was seen at [facility] after an acute fall while crossing the street she tripped over the [facility].  She was not able to catch her fall because her hands were in her pocket.  She fell onto the right side of her face.  Denies loss of consciousness.  She has right facial pain and bilateral wrist pain.  X-rays of both hands show degenerative changes but otherwise there was no fracture or dislocation noted.  She suffered a contusion in her right eye.  CT head and face did not show any acute fracture or hemorrhage. She is still having an aching headache but she says it is much better than before. She says headache is worst around 2-3PM when looking at a screen all day. She says her neck is stiff and a bit sore when she turns her head to the left. Pain is a 2/10. She has been taking ibuprofen daily for the pain with minimal relief. She is also having mild photophobia. No other significant exam findings although patient had a new floater and is still there.  She was given a splint for the wrist pain but just took it off yesterday and reports mild soreness.  She has since followed up with ophthalmology at [facility]-she was advised to follow-up in 6 weeks mid May for retinal check.  She also did report blurry vision since her mechanical fall.      pt has mildly high bp today. She is on losartan-hydrochlorothiazide. Denies cp. Has occasional palpitations. Ct calcium ordered.   Complaint with  meds  She is fidgety but not nervous.   Not checking bp regularly.   She is currently taking losartan 50-hydrochlorothiazide 12.5 mg.  After rechecking her blood pressure it normalized to 138/80.  She is asymptomatic.          She is due for Tdap, zoster-declined vaccines today.  Tobacco:  She has never smoked tobacco.    Current Medications[1]      Review of Systems:  Review of Systems   HENT:  Negative for ear discharge and hearing loss.    Eyes:  Positive for blurred vision and photophobia. Negative for double vision and pain.   Cardiovascular:  Negative for chest pain, palpitations and leg swelling.   Musculoskeletal:  Positive for neck pain.   Neurological:  Positive for headaches. Negative for dizziness and tingling.              Objective:   /80   Pulse 77   Temp 97.2 °F (36.2 °C) (Temporal)   Resp 16   Ht 5' 5\" (1.651 m)   Wt 248 lb (112.5 kg)   LMP 06/11/2016   SpO2 97%   BMI 41.27 kg/m²  Estimated body mass index is 41.27 kg/m² as calculated from the following:    Height as of this encounter: 5' 5\" (1.651 m).    Weight as of this encounter: 248 lb (112.5 kg).    Physical Exam   HEENT: Swelling around R eye with TTP. Normal TM bilaterally.  Eyes: PERRL  MSK: Mild swelling in L wrist with normal ROM and Strength. Negative finkelstein.  Skin: Well healing abrasions around R eye. Mild bruising on anterior L wrist.     Heart: RRR  Lungs: CTAB  Neck: FROM, nontender spinous process.       Assessment & Plan:   1. Essential hypertension (Primary)  2. Elevated blood pressure reading  3. Fall, subsequent encounter  4. Acute pain of left wrist  5. Neck muscle spasm  -     Cyclobenzaprine HCl; Take 1 tablet (5 mg total) by mouth 3 (three) times daily as needed for Muscle spasms.  Dispense: 10 tablet; Refill: 0    1. Elevated blood pressure reading      2. Essential hypertension  BP shows borderline control with last BP of 138/80. Work on lifestyle changes, diet, exercise and weight management.    3/21/2024: POTASSIUM 3.8; CREATININE 0.63; EGFR 105  BP Meds: losartan-hydroCHLOROthiazide Tabs - 50-12.5 MG     We discussed that her blood pressure is borderline controlled.  Could possibly be due to weight gain, recent trauma which led to some pain.  Recommended to monitor blood pressures at home, lose weight, low-salt diet.    3. Fall, subsequent encounter  I reviewed her ER visits, imaging reports, ophthalmology reports.  She is due for a 6-week follow-up with ophthalmology for the floaters that she is seeing in her right eye.  As for the swelling that is around her eyebrow I recommended using a ice pack 3-4 times a day, avoid NSAID use but she may use Tylenol as needed for pain.  Recommended that she take Flexeril for her possible neck spasm that is causing her headaches and stiffness with range of motion of the neck.  Follow-up with me if symptoms are not better in about 1 to 2 weeks.    4. Acute pain of left wrist  This will likely get better with time, x-ray negative for fracture.    5. Neck muscle spasm  Discussed with patient to take this medication nightly only as it can cause drowsiness.  - cyclobenzaprine 5 MG Oral Tab; Take 1 tablet (5 mg total) by mouth 3 (three) times daily as needed for Muscle spasms.  Dispense: 10 tablet; Refill: 0        No follow-ups on file.    Estela Haji DO, 4/13/2025, 9:44 PM          [1]   Current Outpatient Medications   Medication Sig Dispense Refill    cyclobenzaprine 5 MG Oral Tab Take 1 tablet (5 mg total) by mouth 3 (three) times daily as needed for Muscle spasms. 10 tablet 0    FLUOXETINE 20 MG Oral Cap TAKE 1 CAPSULE TWICE A  capsule 3    losartan-hydroCHLOROthiazide 50-12.5 MG Oral Tab Take 1 tablet by mouth daily. 90 tablet 3    montelukast 10 MG Oral Tab Take 1 tablet (10 mg total) by mouth nightly. 90 tablet 3    atorvastatin 10 MG Oral Tab Take 1 tablet (10 mg total) by mouth daily. 90 tablet 3    albuterol 108 (90 Base) MCG/ACT Inhalation Aero  Soln Inhale 2 puffs into the lungs every 4 (four) hours as needed. 5 g 3    Fluticasone Propionate (FLONASE) 50 MCG/ACT Nasal Suspension 2 sprays in each nostril daily as needed 1 Bottle 0    gabapentin 300 MG Oral Cap Take 1 capsule (300 mg total) by mouth nightly. (Patient not taking: Reported on 4/15/2025) 90 capsule 3

## 2025-04-15 ENCOUNTER — OFFICE VISIT (OUTPATIENT)
Dept: FAMILY MEDICINE CLINIC | Facility: CLINIC | Age: 55
End: 2025-04-15
Payer: COMMERCIAL

## 2025-04-15 VITALS
HEART RATE: 77 BPM | BODY MASS INDEX: 41.32 KG/M2 | SYSTOLIC BLOOD PRESSURE: 138 MMHG | RESPIRATION RATE: 16 BRPM | WEIGHT: 248 LBS | TEMPERATURE: 97 F | OXYGEN SATURATION: 97 % | HEIGHT: 65 IN | DIASTOLIC BLOOD PRESSURE: 80 MMHG

## 2025-04-15 DIAGNOSIS — M62.838 NECK MUSCLE SPASM: ICD-10-CM

## 2025-04-15 DIAGNOSIS — M25.532 ACUTE PAIN OF LEFT WRIST: ICD-10-CM

## 2025-04-15 DIAGNOSIS — R03.0 ELEVATED BLOOD PRESSURE READING: ICD-10-CM

## 2025-04-15 DIAGNOSIS — I10 ESSENTIAL HYPERTENSION: Primary | ICD-10-CM
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
## 2025-04-15 PROCEDURE — 99214 OFFICE O/P EST MOD 30 MIN: CPT | Performed by: FAMILY MEDICINE

## 2025-04-15 RX ORDER — CYCLOBENZAPRINE HCL 5 MG
5 TABLET ORAL 3 TIMES DAILY PRN
Qty: 10 TABLET | Refills: 0 | Status: SHIPPED | OUTPATIENT
Start: 2025-04-15 | End: 2025-04-25

## (undated) NOTE — MR AVS SNAPSHOT
[address] [facility] [address] [phone]               Thank you for choosing us for your health care visit with Leelee Rutledge MD.  We are glad to serve you and happy to provide you with this summary of your visi urgency [R39.15]           CTA BRAIN+CTA CAROTIDS (CONTRAST ONLY)(CPT=70496/57911)[[id number]]    Complete by:   Mar 06, 2017 (Approximate)    Assoc Dx:  Hyper reflexia [R29.2], Blurring, right eye [H53.8], Urinary urgency [R39.15], Eye twitch [G24.5], Chronic ? Allow 2 business days for refills; controlled substances may take longer. ?  Contact your pharmacy at least 5 days prior to running out of medication and have them send an electronic request or submit request through the “request refill” option in your M If your physician has recommended that you have a procedure or additional testing performed. [facility]) will contact your insurance carrier to obtain pre-certification or prior authorization.     Unfortunately, [facility] has seen an incr Monica Goddard [phone], [phone]  [address] [id number]     Phone:  [phone]    - gabapentin 100 MG Caps            Results of Recent Testing     CK CREATINE KINASE (NOT CREATININE)      Component Value Standard Range & Units    CK 36 2

## (undated) NOTE — LETTER
Date & Time: 11/1/2021, 4:18 PM  Patient: Joe Guevara  Encounter Provider(s):    Phill Stone Attending  Sophia Sweeney PA-C       To Whom It May Concern:    Joan Osborne was seen and treated in our department on 11/1/2021.  She should not return t

## (undated) NOTE — ED AVS SNAPSHOT
Alisson Zarate   MRN: [id number]    Department:  [facility] in [place]   Date of Visit:  11/24/2018           Disclosure     Insurance plans vary and the physician(s) referred by the ER may not be covered by your plan.  Please tell this physician (or your personal doctor if your instructions are to return to your personal doctor) about any new or lasting problems. The primary care or specialist physician will see patients referred from the [facility] Emergency Department.  Calixto Galeas

## (undated) NOTE — LETTER
Patient Name: Keyur Whitman  YOB: 1970          MRN number:  [id number]  Date:  8/2/2017  Referring Physician:  Ollie Knox    Discharge Summary  Initial Functional Outcome Score 47/100  Final Functional Outcome Score 68/100  Number of V housework, getting up from bed/chair, squatting with < 5/10 pain. - MET  · Pt will be independent and compliant with comprehensive HEP to maintain progress achieved in PT.- MET    Plan: D/C to HEP. Pt has no plans to follow up with MD but will as needed.

## (undated) NOTE — LETTER
[employer] for Linnea Riley     : 1970     Date: 2017  Provider:  Dana Ceja PA-C  Phone for doctor or clinic: [facility], [address] [facility] , [address] [facility] [address] [facility] [address]  [phone]

## (undated) NOTE — MR AVS SNAPSHOT
[facility] [address]  [facility]  [phone]               Thank you for choosing us for your health care visit with Benita Dietrich PA-C.   We are glad to serve you and happy to provide you Order:   Op Referral To [address] [facility], [facility] [address]   Phone:  [phone]   Fax:  [phone]         Referral Orders      Normal Orders This Visit    OP REFERRAL TO [address] [place] [address] clinic staff will provide you with the phone number you should call to schedule your appointment.      If you are confident that your benefit plan will not require a referral or authorization, such as [place], please feel free to schedule your cat [phone], Go to [facility] A ER Building (For example: CT scans, X rays, Ultrasound, MRI)  •Cardiac Testing in ER building Building A second floor Cardiac Testing [phone] (For example: Holter Monitor, Cardiac Stress tests,Event Monitor, or 2D Echocardio If your prescription is due for a refill, you may be due for a follow-up appointment. We cannot refill your maintenance medications at a preventative wellness visit.   To best provide you care, patients receiving maintenance medications need to be seen at You can access your MyChart to more actively manage your health care and view more details from this visit by going to [email or web address] with [email or web address].   If you've recently had a stay at the Hospital you can access your discharge instructions in [address] by chichi

## (undated) NOTE — MR AVS SNAPSHOT
[address] [facility] [address] [phone]               Thank you for choosing us for your health care visit with Adeel Franklin MD.  We are glad to serve you and happy to provide you with this summary of your visi ? Refills are not addressed on weekends; covering physicians do not authorize routine medications on weekends. ? No narcotics or controlled substances are refilled after noon on Fridays or by on call physicians.   ? By law, narcotics cannot be faxed or jaquelin approved and is a COVERED benefit. Although the [facility] staff does its due diligence, the insurance carrier gives the disclaimer that \"Although the procedure is authorized, this does not guarantee payment. \"    Ultimately the patient is responsible for payme discharge instructions in [employer] by going to Visits < Admission Summaries. If you've been to the Emergency Department or your doctor's office, you can view your past visit information in [employer] by going to Visits < Visit Summaries. [employer] questions?

## (undated) NOTE — MR AVS SNAPSHOT
[address] [facility] [address] [phone]               Thank you for choosing us for your health care visit with Cathie rKamer MD.  We are glad to serve you and happy to provide you with this summary of your visi office, you can view your past visit information in [facility] by going to Visits < Visit Summaries. [facility] questions? Call [phone] for help. [facility] is NOT to be used for urgent needs. For medical emergencies, dial 911.            Visit [facility]

## (undated) NOTE — Clinical Note
TCM call completed. Pt declined TCM appt at this time. Pt is feeling better. Plans to schedule with Pulmonology as advised. Thank you.

## (undated) NOTE — MR AVS SNAPSHOT
[facility] [address]  [place]  [phone]               Thank you for choosing us for your health care visit with Dana Ceja PA-C.   We are glad to serve you and happy to provide you Follow-up Instructions     Return in about 3 months (around 5/21/2017).          Referral Details     Referred By    Referred To    Susan Andersen PA-C   [address]   Phone:  [phone]   Fax:  [phone]    Diagnoses: [email or web address]  • You can NOW use the [employer] to book your appointments with us, or consider to use open access scheduling which is through the [employer] website [email or web address] and type in [place], [place] and follow the links for \" business days to receive approval from your insurance company.  Once our office has called informing you that the insurance company approved your testing, please call Central Scheduling at [phone]  Please allow our office 5 business days to contact you If you've recently had a stay at the Hospital you can access your discharge instructions in [employer] by going to Visits < Admission Summaries.  If you've been to the Emergency Department or your doctor's office, you can view your past visit information in My Visit [facility] online at  [email or web address].tn

## (undated) NOTE — ED AVS SNAPSHOT
Pilar Shearer   MRN: [id number]    Department:  [facility] Emergency Department in [place]   Date of Visit:  5/19/2018           Disclosure     Insurance plans vary and the physician(s) referred by the ER may not be covered by your plan.  Please c tell this physician (or your personal doctor if your instructions are to return to your personal doctor) about any new or lasting problems. The primary care or specialist physician will see patients referred from the [facility] Emergency Department.  Aylin Gamez

## (undated) NOTE — LETTER
[employer]AN for Talya Bower     : 1970     Date: 2019  Provider:  Jaja oGnzalez DO  Phone for doctor or clinic: [address] [facility], [address] [place] [address] , [address] [facility]  [phone]

## (undated) NOTE — Clinical Note
03/06/2017    Dear Dr. Lexis [place]      Thank you for referring your patient, Meghan Rebollar to me for an evaluation. Please see my initial consult note enclosed below. Let me know if you have any questions.     Thank you  aHlley Syed MD, pain in the back of her neck, with associated nausea and emesis, as well as photophobia/phonophobia, with retro-orbital pressure sensation.   Previously she was having more frequent migraines, but currently only has every 6-8 months, and they are relieved w Albuterol Sulfate  (90 BASE) MCG/ACT Inhalation Aero Soln Inhale 1 puff into the lungs every 6 (six) hours as needed for Wheezing. Disp: 1 Inhaler Rfl: 1   Escitalopram Oxalate (LEXAPRO) 20 MG Oral Tab Take 1 tablet (20 mg total) by mouth daily.  Dis Shoulder Shrug: normal bilaterally   Lateral head turn: normal bilaterally  Hypoglossal:   Tongue movement: protrusion is midline with normal lateral movements    Motor System:  Strength: 5/5 throughout  Tone: normal    Sensory:  Pin is normal  Vibration paranasal sinuses and mastoid air cells are unremarkable.  The expected major intracranial flow voids are present.      The inner ear structures have a normal signal and morphology.  No focal mass or fluid collection is seen along the internal auditory can not in a specific distribution to suggest underlying demyelinating disease. MRI of the orbits was done, with no evidence for optic neuritis, or other pathology to explain her ocular findings.   Differential is broad, and may include neuromuscular disorder, GRAVIS REFLEX PANEL, MRI CERVICAL+THORACIC         SPINE (ALL W+WO) (CPT=72364/31947), KINJAL,         DIRECT, REFLEX TO 9 ENAS, ANCA PANEL VASCULITIS        W/REFLEX, AQUAPORIN-4 RECEPTOR ANTIBODY,         RHEUMATOID ARTHRITIS FACTOR, SED RATE,